# Patient Record
Sex: FEMALE | Race: ASIAN | NOT HISPANIC OR LATINO | Employment: STUDENT | URBAN - METROPOLITAN AREA
[De-identification: names, ages, dates, MRNs, and addresses within clinical notes are randomized per-mention and may not be internally consistent; named-entity substitution may affect disease eponyms.]

---

## 2018-11-25 ENCOUNTER — NURSE TRIAGE (OUTPATIENT)
Dept: NURSING | Facility: CLINIC | Age: 28
End: 2018-11-25

## 2018-11-25 NOTE — TELEPHONE ENCOUNTER
"Patient calling reporting having \"withdrawal\" from Duloxetine medication. Reports having tremors and feeling like her heart is racing. States feeling nauseated and dizzy. Per guideline, advised patient to be seen at the emergency department. Caller verbalized understanding. Denies further questions.      Devyn Salmon RN  [facility]       Reason for Disposition    Feeling very shaky (i.e., visible tremors of hands)    Additional Information    Negative: Coma (e.g., not moving, not talking, not responding to stimuli)    Negative: Difficult to awaken or acting confused  (e.g., disoriented, slurred speech)    Negative: Seeing, hearing, or feeling things that are not there (i.e., visual, auditory, or tactile hallucinations)    Negative: Slow, shallow and weak breathing    Negative: Seizure    Negative: Violent behavior or threatening to kill someone    Negative: Sounds like a life-threatening emergency to the triager    Negative: Suicide thoughts, threats and attempts, questions or concerns about    Negative: Recent significant injury, see that guideline (e.g., head, neck, chest, abdominal or extremity  injury)    Negative: Other significant medical symptom is present, see that guideline (e.g., chest pain, headache, vomiting)    Negative: Alcohol use, abuse or dependence: question or problem related to    Negative: Depression is main problem or symptom (e.g., feelings of sadness or hopelessness)    Negative: [1] Fever > 100.5 F (38.1 C) AND [2] IVDA (intravenous drug abuse)    Negative: Bizarre, paranoid or confused behavior    Protocols used: SUBSTANCE ABUSE AND DEPENDENCE-ADULT-      "

## 2020-09-08 ENCOUNTER — TRANSFERRED RECORDS (OUTPATIENT)
Dept: HEALTH INFORMATION MANAGEMENT | Facility: CLINIC | Age: 30
End: 2020-09-08

## 2020-09-08 ENCOUNTER — MEDICAL CORRESPONDENCE (OUTPATIENT)
Dept: HEALTH INFORMATION MANAGEMENT | Facility: CLINIC | Age: 30
End: 2020-09-08

## 2020-09-09 ENCOUNTER — TRANSCRIBE ORDERS (OUTPATIENT)
Dept: OTHER | Age: 30
End: 2020-09-09

## 2020-09-09 DIAGNOSIS — T78.40XA ALLERGIC REACTION: Primary | ICD-10-CM

## 2020-10-17 NOTE — TELEPHONE ENCOUNTER
FUTURE VISIT INFORMATION      FUTURE VISIT INFORMATION:    Date: 11.16.20    Time: 4:30    Location: [facility]  REFERRAL INFORMATION:    Referring provider:  Dr. Agnieszka Farris    Referring providers clinic:  [facility]    Reason for visit/diagnosis  new, allergic reaction in the mouth, itchy and numb inner cheek and roof of the mouth, per pt, referred by Dr. Farris from [facility] recs in [facility]    RECORDS REQUESTED FROM:       Clinic name Comments Records Status Imaging Status   [place] 9.8.20  Dr. Farris [facility] N/A

## 2020-11-16 ENCOUNTER — PRE VISIT (OUTPATIENT)
Dept: ALLERGY | Facility: CLINIC | Age: 30
End: 2020-11-16

## 2020-11-16 ENCOUNTER — OFFICE VISIT (OUTPATIENT)
Dept: ALLERGY | Facility: CLINIC | Age: 30
End: 2020-11-16
Attending: FAMILY MEDICINE
Payer: COMMERCIAL

## 2020-11-16 DIAGNOSIS — L20.89 OTHER ATOPIC DERMATITIS: ICD-10-CM

## 2020-11-16 DIAGNOSIS — L23.89 ALLERGIC CONTACT DERMATITIS DUE TO OTHER AGENTS: Primary | ICD-10-CM

## 2020-11-16 DIAGNOSIS — J30.89 SEASONAL ALLERGIC RHINITIS DUE TO OTHER ALLERGIC TRIGGER: ICD-10-CM

## 2020-11-16 DIAGNOSIS — Z91.018 FOOD ALLERGY: ICD-10-CM

## 2020-11-16 PROCEDURE — 99203 OFFICE O/P NEW LOW 30 MIN: CPT | Mod: 25 | Performed by: DERMATOLOGY

## 2020-11-16 PROCEDURE — 95044 PATCH/APPLICATION TESTS: CPT | Performed by: DERMATOLOGY

## 2020-11-16 PROCEDURE — 95044 PATCH/APPLICATION TESTS: CPT | Mod: 59 | Performed by: DERMATOLOGY

## 2020-11-16 PROCEDURE — 95004 PERQ TESTS W/ALRGNC XTRCS: CPT | Performed by: DERMATOLOGY

## 2020-11-16 RX ORDER — OMEGA-3/DHA/EPA/FISH OIL 60 MG-90MG
CAPSULE ORAL
COMMUNITY
End: 2021-10-26

## 2020-11-16 ASSESSMENT — PAIN SCALES - GENERAL: PAINLEVEL: NO PAIN (0)

## 2020-11-16 NOTE — NURSING NOTE
Allergy Rooming Note    Murtaza Arroyo's goals for this visit include:   Chief Complaint   Patient presents with     Allergy Consult     Murtaza is here for an allergy consult relating to oral swelling with unknown cause.      Uzma Cameron LPN

## 2020-11-16 NOTE — PROGRESS NOTES
[facility] Dermato-allergology note      Allergy Problem List:    Specialty Problems     None          CC:   No chief complaint on file.        Encounter Date: Nov 16, 2020    History of Present Illness:  I have reviewed the existing informations with patient personally, in Epic and other sources including the nursing intake corresponding to this issue. Murtaza Arroyo is a 30 year old female who presents to the consult  in person     Problems with the oral lesions started about 2 years ago with flare-ups that can last several days. Usually very red and swollen, no ulcers. Generally no pain or itchiness, but more numb. Tip of tongue can be very itchy    Numb feeling under tongue and cheek area with feeling of swelling, never breathing problems after tree nuts (almonds) and less peanuts. Can sometimes stay for 12h or more.    Patient has Rhinoconjunctivitis after contact with cat and more symptoms in summer (July-September) --> more runny nose  Never had Asthma    Has flexural eczema more in summer since childhood    The most recent flare up was about 6 weeks ago. Had treatment of root canal and 1 day later had under the tongue redness and swelling ==> this feeling was there for about 5 days    Patient had 2 years ago a biopsy and reacted there to the band aid and pain relief patch      Aunt and grandfather have Asthma    Past Medical History:   There is no problem list on file for this patient.    No past medical history on file.    Allergy History:   Not on File    Social History:  The patient works as a  (computer). Patient has the following hobbies or non-occupational exposure: climbing and snow boarding      Family History:  No family history on file.    Medications:  No current outpatient medications on file.       Review of Systems:  -As per HPI  -Constitutional: The patient denies fatigue, fevers, chills, unintended weight loss, and night sweats.  -HEENT: Patient denies nonhealing oral  sores.  -Skin: As above in HPI. No additional skin concerns.    Physical exam:  Vitals: There were no vitals taken for this visit.  GEN: This is a well developed, well-nourished female in no acute distress, in a pleasant mood.    Skin: Focused examination of the oral cavity and face within the consultation was performed.   Mucus membrane diffusely erythematous and slightly atrophic, but no signs for erosions or ulcerations.   Chewing rim along the cheeks  For test results on arms see results below  As above in HPI. No additional skin concerns.  - upper respiratory tract: currently no obvious Rhinitis  - lungs: no signs for active and present Asthma/Wheezing or coughing  - eyes: currently no active conjunctivits  - GI system/digestion: currently no problems, no bloating or Diarrhoea      Allergy Tests:    Past Allergy Test: spec IgE in [place] from September 2020  All negatives to tuna, salmon, [place], cashew, hazelnut, sesame, scallop, shrimp, soybean, milk, codfish, walnut, wheat, peanut, egg white,     Future Allergy Test: 11/16/2020       Order for PRICK TESTS    [x] Outpatient  [] Inpatient: [place]..../ Bed ....      Skin Atopy (atopic dermatitis) [x] Yes   [] No  Contact allergies:   [] Yes   [x] No  Urticaria/Angioedema  [x] Yes   [] No  Rhinitis/Sinusitis:   [x] Yes   [] No   [] seasonal [x] perennial            Allergic Asthma:   [] Yes   [x] No  Pets :  [x] Yes   [] No  Which? 2 cats and 1 dog (into bedroom and on bed)  Food Allergy:   [x] Yes   [] No tree nuts?  Drug allergies:   [] Yes   [x] No      Reason for tests (suspected allergy): oral allergy syndrome to tree nuts  Known previous allergies: none    Standardized prick panels  [x] Atopic panel (20 tests)  [] Pediatric Panel (12 tests)  [] Milk, Meat, Eggs, Grains (20 tests)   [] Dust, Epithelia, Feathers (10 tests)  [] Fish, Seafood, Shellfish (17 tests)  [x] Nuts, Beans (14 tests)  [] Spice, Vegetable, Fruit (17 tests)  [] Others: ...      []  Patient's own products: ...    DO NOT test if chemical or biological identity is unknown!     always ask from patient the product information and safety sheets (MSDS)     Order for PATCH TESTS  Reason for tests (suspected allergy): oral contact allergy  Known previous allergies: none    Standardized panels  [x] Standard panel (40 tests)  [x] Preservatives & Antimicrobials (31 tests)  [x] Emulsifiers & Additives (25 tests)   [x] Perfumes/Flavours & Plants (25 tests)  [] Hairdresser panel (12 tests)  [] Rubber Chemicals (22 tests)  [x] Plastics (26 tests)  [] Colorants/Dyes/Food additives (20 tests)  [] Metals (implants/dental) (24 tests)  [] Local anaesthetics/NSAIDs (14 tests)  [] Antibiotics & Antimycotics (14 tests)   [] Corticosteroids (15 tests)   [] Photopatch test (62 tests)   [] others: ...      Atopy Screen (Placed 11/16/20 )    No Substance Readings (15 min) Evaluation   POS Histamine 1mg/ml ++    NEG NaCl 0.9% -      No Substance Readings (15 min) Evaluation   1 Alternaria alternata (tenuis)  -    2 Cladosporium herbarum -    3 Aspergillus fumigatus -    4 Penicillium notatum -    5 Dermatophagoides pteronyssinus -    6 Dermatophagoides farinae -    7 Dog epithelium (canis spp) -    8 Cat hair (denise catus) -    9 Cockroach   (Blatella americana & germanica) -    10 Grass mix midwest   (Sloane, Orchard, Redtop, Thanh) -    11 Bobby grass (sorghum halepense) -    12 Weed mix   (common Cocklebur, Lamb s quarters, rough redroot Pigweed, Dock/Sorrel) -    13 Mug wort (artemisia vulgare) -    14 Ragweed giant/short (ambrosia spp) -    15 English Plantain (plantago lanceolata) -    16 Tree mix 1 (Pecan, Maple BHR, Oak RVW, american [place], black [place]) -    17 Red cedar (juniperus virginia) -    18 Tree mix 2   (white Kamari, river/red Birch, black Gabbs, common [place], american Elm) -    19 Box elder/Maple mix (acer spp) -    20 [place] shagbark (carya ovata) -       -        No Substance Readings  (15min) Evaluation   1 [place] (prunus dulcis) -    2 Brazil nut (bertholletia excels) -    3 Cashew nut (anacardium occidentale) -    4 Coconut (cocos nucifera) -    5 Hazelnut (corylus americana) -    6 Pecan (carya illinoinensis) -    7 [place] (juglans regia) -    8 Pistachio nut (pistacia vera) -    9 Peanut (arachis hypogaea) -    10  Soybean (glycine max)                            -      Conclusion: no signs for specific immediate type hypersensitivity to any food allergen    STANDARD Series      No Substance 2 days 4 days remarks   1 Tab Mix [C] - -    2 Colophony - -    3  2-Mercaptobenzothiazole  - -     4 Methylisothiazolinone - -    5 Carba Mix - -    6 Thiuram Mix [A] - -    7 Bisphenol A Epoxy Resin - -    8 Q-Bjyn-Vlmyrmgxljo-Formaldehyde Resin - -    9 Mercapto Mix [A] - -    10 Black Rubber Mix- PPD [B] - -    11 Potassium Dichromate  -  -    12 Balsam of [place] (Myroxylon Pereirae Resin) - -    13 Nickel Sulphate Hexahydrate - -    14 Mixed Dialkyl Thiourea - -    15 Paraben Mix [B] - -    16 Methyldibromo Glutaronitrile - -    17 Fragrance Mix - -    18 2-Bromo-2-Nitropropane-1,3-Diol (Bronopol) - -    19 Lyral - -    20 Tixocortol-21- Pivalate - -    21 Diazolidiyl Urea (Germall II) - -    22 Methyl Methacrylate - -    23 Cobalt (II) Chloride Hexahydrate - -    24 Fragrance Mix II  - -    25 Compositae Mix - -    26 Benzoyl Peroxide - -    27 Bacitracin - -    28 Formaldehyde - -    29 Methylchloroisothiazolinone / Methylisothiazolinone - -    30 Corticosteroid Mix - -    31 Sodium Lauryl Sulfate - -    32 Lanolin Alcohol - -    33 Turpentine - -    34 Cetylstearylalcohol - -    35 Chlorhexidine Dicluconate - -    36 Budenoside - -    37 Imidazolidinyl Urea  - -    38 Ethyl-2 Cyanoacrylate - -    39 Quaternium 15 (Dowicil 200) - -    40 Decyl Glucoside - -      PLASTICS     No Substance 2 days 4 days remarks     Acrylates - -     1 2-Hydroxyethyl Methacrylate (HEMA) - -     2  1,4-Butandioldimethacrylate (BUDMA) - -     3  2-Ethylhexyl Acrylate - -     4 Bisphenol-A-Dimethacrylate  - -     5 Diurethane-Dimethacrylate - -     6 Ethyleneglycoldimethacrylate (EGDMA) - -     7 Pentaerythritoltriacrylate (WON) - -     8 Triethylene Glycol Dimethacrylate (TEGDMA) - -      Synthetic material/additives        9 M-Obqv-Rnuuateziuk - -     10 Tricresyl Phosphate - -     11 9-Fvyx-Zfbhaalodvyyy - -     12 Bis (2-Ethylhexyl) Phthalate - -     13 Dibutylphthalate - -     14 Dimethylphthalate - -     15 Toluene-2,4-Diisocyanate - -     16 Diphenylmethane-4,4''-Diisocyanate - -      EPOXY RESIN SYSTEMS        Reactive Solvents - -     17 Cresyl Glycidyl Ether - -     18 Butyl Glycidyl Ether - -     19 Phenyl Glycidyl Ether - -     20 1,4-Butanediol Diglycidyl Ether - -     21 1,6-Hexanediole Diglycidyl Ether - -      Hardener / Accelerator - -     22 Triethylenetetramine - -     23 Diethylenetriamine - -     24 Isophorone Diamine (IPD) - -     25 N,N-Dimethyl-P-Toluidine - -      EMULSIFIERS & ADDITIVES   No Substance 2 days 4 days remarks    1 Polyethylene Glycol-400 - -     2 Cocamidopropyl Betaine - -     3 Amerchol L101 - -     4 Propylene Glycol - -     5 Triethanolamine - -     6 Sorbitane Sesquiolate - -     7 Isopropylmyristate - -     8 Polysorbate 80  - -     9 Amidoamine   (Stearamidopropyl Dimethylamine) - -     10 Oleamidopropyl Dimethylamine - -     11 Lauryl Glucoside - -     12 Coconut Diethanolamide  - -     13 2-Hydroxy-4-Methoxy Benzophenone (Oxybenzone) - -     14 Benzophenone-4 (Sulisobenzon) - -     15 Propolis - -     16 Dexpanthenol - -     17 Carboxymethyl Cellulose Sodium - -     18 Abitol - -     19 Tert-Butylhydroquinone - -     20 Benzyl Salicylate - -      Antioxidant       21 Dodecyl Gallate - -     22 Butylhydroxyanisole (BHA) - -     23 Butylhydroxytoluene (BHT) - -     24 Di-Alpha-Tocopherol (Vit E) - -     25 Propyl Gallate - -     26 Zinc Pyrithione       27  Dimethylaminopropylamin (DMAPA)        PRESERVATIVES & ANTIMICROBIALS     No Substance 2 days 4 days remarks    1  1,2-Benzisothiazoline-3-One, Sodium Salt - -     2  1,3,5-Renard (2-Hydroxyethyl) - Hexahydrotriazine (Grotan BK) - -     3 4-Velobfilnxjyu-4-Nitro-1, 3-Propanediol - -     4  3, 4, 4' - Triclocarban - -     5 4 - Chloro - 3 - Cresol - -     6 4 - Chloro - 4 - Xylenol (PCMX) - -     7 7-Ethylbicyclooxazolidine (Bioban [id number]) - -     8 Benzalkonium Chloride - -     9 Benzyl Alcohol - -     10 Cetalkonium Chloride - -     11 Cetylpyrimidine Chloride  - -     12 Chloroacetamide - -     13 DMDM Hydantoin - -     14 Glutaraldehyde - -     15 Triclosan - -     16 Glyoxal Trimeric Dihydrate - -     17 Iodopropynyl Butylcarbamate - -     18 Octylisothiazoline - -     19 Iodoform - -     20 (Nitrobutyl) Morpholine/(Ethylnitro-Trimethylene) Dimorpholine (Bioban P 1487) - -     21 Phenoxyethanol - -     22 Phenyl Salicylate - -     23 Povidone Iodine - -     24 Sodium Benzoate - -     25 Sodium Disulfite - -     26 Sorbic Acid - -     27 Thimerosal - -     28 Melamine Formeladyde Resin       29 Ethylenediamine Dihydrochloride        Parabens       30 Butyl-P-Hydroxybenzoate - -     31 Ethyl-P-Hydroxybenzoate - -     32 Methyl-P-Hydroxybenzoate - -     33 Propyl-P-Hydroxybenzoate - -      PERFUMES, FLAVORS & PLANTS      No Substance 2 days 4 days remarks    1 Benzyl Cinnamate - -     2 Di-Limonene (Dipentene) - -     3 Cananga Odorata (Toni Muñoz) (I) - -     4 Lichen Acid Mix - -     5 Mentha Piperita Oil (Peppermint Oil) - -     6 Sesquiterpenelactone mix - -     7 Tea Tree Oil, Oxidized - -     8 Wood Tar Mix - -     9 Abietic Acid - -     10 Lavendula Angustifolia Oil (Lavender Oil) - -     11 Camphor  - -      Fragrance Mix I       12 Oakmoss Absolute - -     13 Eugenol - -     14 Geraniol - -     15 Hydroxycitronellal - -     16 Isoeugenol - -     17 Cinnamic Aldehyde - -     18 Cinnamic Alcohol  - -       Fragrance mix II       19 Citronellol - -     20 Alpha-Hexylcinnamic Aldehyde    - -     21 Citral - -     22 Farnesol - -     23 Coumarin - -        OTHER PRODUCTS      No Substance Conc  % solv 2 days 4 days remarks    1          2          3          4          5          6          7          8          9          10           Results of patch tests:                         Interpretation:    - Negative                    A    = Allergic      (+) Erythema    TI   = Toxic/irritant   + E + Infiltration    RaP = Relevance at Present     ++ E/I + Papulovesicle   Rpr  = Relevance Previously     +++ E/I/P + Blister     nR   = No Relevance        Impression/Plan:    # Atopic predisposition with:    Flexural eczema since childhood    seasona Rhinitis July to September    RC after contact with cat    Possible oral allergy syndrome to tree nuts (Almonds), less peanuts, not soy    # possible delayed type hypersensitivity to tooth paste or mouth water and oral symptoms    Also reactions to band aid  --> perform patch tests    Patient counseling:  Explained the different types of tests      Follow-up: in Derm-Allergy clinic for readings of patch tests after 2 days (1st readings) = virtual and 2nd readings after 4 days    I spent a total of 40 minutes face to face with Murtaza Arroyo during today s office visit. Over 50% of this time was spent counseling the patient and/or coordinating care.  Please see Assessment and Plan for details.  This excludes any time spent performing prick and patch tests    Thank you for the opportunity be involved in the care of this patient.     Staff: Uzma Cameron LPN

## 2020-11-16 NOTE — PATIENT INSTRUCTIONS
WHAT IS A SKIN PRICK TEST?   A skin prick test is a simple and reliable diagnostic test used to identify substances ( allergens ) responsible for triggering the symptoms of allergy. The basic skin prick testing panel includes common allergens, such as house dust mites, molds, dog and cat allergens and pollen allergens. We have other more specialized series for various food allergens and sometimes we test your own foods directly on your skin as well. Tests will usually be performed on the skin of the forearm. The skin may develop a red and itchy reaction which can be an indication of an allergy to the tested substance, but will be confirmed by discussion with the allergy specialist     HOW IS A SKIN PRICK TEST PERFORMED?   The skin will be disinfected with alcohol, then marked and numbered with a pen to identify the areas where the specific allergens will be tested.   Afterwards a drop of the allergen solution will be placed on the skin and then the skin gently pricked using the tip of a specially designed prick needle.   With this procedure the most superficial part of the skin will be pierced to allow the test solution to diffuse into the skin and make contact with the reactive immune cells.   After 20 min the area will be examined and evaluated for possible allergic reactions.     WHAT ARE THE POSSIBLE RISKS OF A SKIN PRICK TEST?   If the skin reacts it will develop red, itchy wheals of 5-30mm diameter.   The wheal and itch will usually disappear spontaneously after 1-2 hours.   Sometimes there might be a delayed reactions after days and this has to be reported to the treating allergy specialist (could be another kind of reaction pattern and important piece of information).   Rarely there are more serious generalized allergic reactions observed and therefore you will be under observation of the allergy team during the entire procedure.   There is a small risk for some bleeding and skin infection by the  SPT.    WHAT DO I NEED TO DO BEFORE SKIN PRICK TEST?  Stop all antihistamines for at lease 1 week.  Stop all oral steroids at lease 1 month prior to testing.     Patch Testing Information  What are allergen patch tests?    The test is done to look for skin allergies that may be causing rashes and irritation.    A patch test is a way of identifying whether a substance has caused a delayed reaction with skin inflammation, such as contact eczema or delayed (after days) reactions to drugs.     We will use various types of test compounds, which may include common allergens you may come in contact with in daily life such as preservatives, fragrances or even drugs.     Most of the time we will use standardized, prefabricated test solutions. The choice of the substances and series tested will depend on your history of reactions. Sometimes we will test your own products as well.     In order to avoid severe toxic reactions we need detailed information or safety sheets for each of the test compounds.    The test panels are set up with a small amount of common substances that cause skin allergies. They are taped to your skin with a clear hypoallergenic bandage and reinforced hypoallergenic tape.    The substances are numbered, so it is easy to tell what is causing a skin reaction.  What do I need to do in preparation for the test?    Stop all systemic steroids 1 month prior to the testing.     Stop applying topical steroids to the test area one week prior to the test. It is to use them elsewhere throughout the testing process. If this is not possible then discuss options with the Allergy specialist.    Do not go sunbathing or tanning for one week prior to testing    It is okay to take antihistamines as normal.     Please wear dark colors the week of the test since we will write on you with a dark marker that may transfer and stain clothing or bedding.     Some medications can affect the reactions to allergens during the tests.  Therefore reveal all the medications you take to the allergy team. The doctor will discuss the medications with you before the tests.     Eat how you normally would.    Avoid the following:    You cannot get the test area wet during the entire test period. This means no bathing or swimming the entire test period.    No strenuous exercise that may cause sweating.    Do not scratch the test area, this can cause the allergen to spread and give us false positives.     Avoid exposure to UV irradiation. This means no tanning or UV treatment during the testing period.   What can I expect?    Patch testing is done over three appointments.   o The usual schedule is: Monday (Allergen patches are placed), Wednesday (Patches are removed and skin is examined by the MD), and Friday (Skin is examined by the MD)      If you are allergic, there will be an area of irritation where the test was placed.    Itching or burning at the test site might happen if you are allergic to the allergen.  Do not rub or scratch the test site since this may spread the allergen and possibly cause false positives. If itching or burning is not tolerable please contact the clinic.    The marker we draw on your back with ma take up to 5 weeks to wash off completely. Rubbing alcohol can help speed up this process.     Reactions can occur 1 to 2 weeks after the tests are applied. If this happens please take photos of the area and contact the clinic.  What should I do after the tests are placed?    Keep the area dry. No showering or getting the test area wet from the time we see you at your first visit until after your third appointment. If you get the test area wet you are washing off the test and we could get false negative reactions.    If you notice any of the test patches coming loose put on more tape to re-secure the test.    If the marker that is applied fades you can use a dark pen to maddy around the panel sites.    Cover the test area when you are  outside to avoid any sun exposure while the test is in place.     You can remove the tests only if there is a severe reaction (itch, pain, blistering). Please report this to your doctor immediately. If you have to remove the tests please maddy the locations of each test field with a grid so we can identify the allergen.  WHAT ARE THE POSSIBLE RISKS OF PATCH TESTS     If you are allergic to a compound tested you will develop a localized skin reaction similar to your previous reaction, this may take days to develop. These reactions include a formation of red, itchy skin lesions that could be about a centimeter with small vesicles or possibly even blisters. The lesions will fade over time, this may take weeks. The test might leave some skin pigmentation for a few months.     In rare cases a localized reaction to patch testing can become generalized.     The tests with your own products might have some risks because they are not standardized and unanticipated reactions could occur. We need as much information as possible to evaluate if your own products are safe to test and under what conditions. This has to be evaluated for each individual product.   Useful Contact Information    To contact your doctor you can either send a [employer] message or call [phone]    Address  o [address] [place] [address], [address]    If you develop any serious or adverse allergic reaction after office hours please seek immediate medical assistance at the nearest clinic, urgent care, or emergency room.    Removal of Patch Tests on Day 3:    1. Remove patches and tape from one test area (one rectangle)          2. Using the purple surgical markers provided (or other permanent marker), draw a grid around the test area so that the circular indentation is in the center of each square, as below. Try to be as neat as possible and keep the lines as straight as you can (you can use a ruler if you need to)          3. Redraw the number  that was underneath the tape above the grids, as shown below. Try to print clearly.          4. Repeat the process for the remaining test areas.          5. Photograph the entire area then take close-up photos of any possible reactions. Examples of possible reactions are spots that look like these:          6. Return to clinic for evaluation as instructed. You should continue to avoid getting the area wet (no showering or strenuous exercise), exposing the area to UV light, using topical steroids, or scratching.         Who should I call with questions?    [facility], [place]: [phone]    For urgent needs outside of business hours call the [facility] at [phone] and ask for the dermatology resident on call      If you develop any serious or adverse allergic reaction after office hours please seek immediate medical assistance at the nearest clinic or emergency room

## 2020-11-16 NOTE — LETTER
11/16/2020         RE: Murtaza Arroyo  [address]  [facility] [address]        Dear Colleague,    Thank you for referring your patient, Murtaza Arroyo, to the [facility] [place]. Please see a copy of my visit note below.    [facility] Dermato-allergology note      Allergy Problem List:    Specialty Problems     None          CC:   No chief complaint on file.        Encounter Date: Nov 16, 2020    History of Present Illness:  I have reviewed the existing informations with patient personally, in Epic and other sources including the nursing intake corresponding to this issue. Murtaza Arroyo is a 30 year old female who presents to the consult  in person     Problems with the oral lesions started about 2 years ago with flare-ups that can last several days. Usually very red and swollen, no ulcers. Generally no pain or itchiness, but more numb. Tip of tongue can be very itchy    Numb feeling under tongue and cheek area with feeling of swelling, never breathing problems after tree nuts (almonds) and less peanuts. Can sometimes stay for 12h or more.    Patient has Rhinoconjunctivitis after contact with cat and more symptoms in summer (July-September) --> more runny nose  Never had Asthma    Has flexural eczema more in summer since childhood    The most recent flare up was about 6 weeks ago. Had treatment of root canal and 1 day later had under the tongue redness and swelling ==> this feeling was there for about 5 days    Patient had 2 years ago a biopsy and reacted there to the band aid and pain relief patch      Aunt and grandfather have Asthma    Past Medical History:   There is no problem list on file for this patient.    No past medical history on file.    Allergy History:   Not on File    Social History:  The patient works as a  (computer). Patient has the following hobbies or non-occupational exposure: climbing and snow boarding      Family History:  No  family history on file.    Medications:  No current outpatient medications on file.       Review of Systems:  -As per HPI  -Constitutional: The patient denies fatigue, fevers, chills, unintended weight loss, and night sweats.  -HEENT: Patient denies nonhealing oral sores.  -Skin: As above in HPI. No additional skin concerns.    Physical exam:  Vitals: There were no vitals taken for this visit.  GEN: This is a well developed, well-nourished female in no acute distress, in a pleasant mood.    Skin: Focused examination of the oral cavity and face within the consultation was performed.   Mucus membrane diffusely erythematous and slightly atrophic, but no signs for erosions or ulcerations.   Chewing rim along the cheeks  For test results on arms see results below  As above in HPI. No additional skin concerns.  - upper respiratory tract: currently no obvious Rhinitis  - lungs: no signs for active and present Asthma/Wheezing or coughing  - eyes: currently no active conjunctivits  - GI system/digestion: currently no problems, no bloating or Diarrhoea      Allergy Tests:    Past Allergy Test: spec IgE in [place] from September 2020  All negatives to tuna, salmon, Dawson, cashew, hazelnut, sesame, scallop, shrimp, soybean, milk, codfish, walnut, wheat, peanut, egg white,     Future Allergy Test: 11/16/2020       Order for PRICK TESTS    [x] Outpatient  [] Inpatient: [place]..../ Bed ....      Skin Atopy (atopic dermatitis) [x] Yes   [] No  Contact allergies:   [] Yes   [x] No  Urticaria/Angioedema  [x] Yes   [] No  Rhinitis/Sinusitis:   [x] Yes   [] No   [] seasonal [x] perennial            Allergic Asthma:   [] Yes   [x] No  Pets :  [x] Yes   [] No  Which? 2 cats and 1 dog (into bedroom and on bed)  Food Allergy:   [x] Yes   [] No tree nuts?  Drug allergies:   [] Yes   [x] No      Reason for tests (suspected allergy): oral allergy syndrome to tree nuts  Known previous allergies: none    Standardized prick panels  [x] Atopic  panel (20 tests)  [] Pediatric Panel (12 tests)  [] Milk, Meat, Eggs, Grains (20 tests)   [] Dust, Epithelia, Feathers (10 tests)  [] Fish, Seafood, Shellfish (17 tests)  [x] Nuts, Beans (14 tests)  [] Spice, Vegetable, Fruit (17 tests)  [] Others: ...      [] Patient's own products: ...    DO NOT test if chemical or biological identity is unknown!     always ask from patient the product information and safety sheets (MSDS)     Order for PATCH TESTS  Reason for tests (suspected allergy): oral contact allergy  Known previous allergies: none    Standardized panels  [x] Standard panel (40 tests)  [x] Preservatives & Antimicrobials (31 tests)  [x] Emulsifiers & Additives (25 tests)   [x] Perfumes/Flavours & Plants (25 tests)  [] Hairdresser panel (12 tests)  [] Rubber Chemicals (22 tests)  [x] Plastics (26 tests)  [] Colorants/Dyes/Food additives (20 tests)  [] Metals (implants/dental) (24 tests)  [] Local anaesthetics/NSAIDs (14 tests)  [] Antibiotics & Antimycotics (14 tests)   [] Corticosteroids (15 tests)   [] Photopatch test (62 tests)   [] others: ...      Atopy Screen (Placed 11/16/20 )    No Substance Readings (15 min) Evaluation   POS Histamine 1mg/ml ++    NEG NaCl 0.9% -      No Substance Readings (15 min) Evaluation   1 Alternaria alternata (tenuis)  -    2 Cladosporium herbarum -    3 Aspergillus fumigatus -    4 Penicillium notatum -    5 Dermatophagoides pteronyssinus -    6 Dermatophagoides farinae -    7 Dog epithelium (canis spp) -    8 Cat hair (denise catus) -    9 Cockroach   (Blatella americana & germanica) -    10 Grass mix midwest   (Sloane, Orchard, Redtop, Thanh) -    11 Bobby grass (sorghum halepense) -    12 Weed mix   (common Cocklebur, Lamb s quarters, rough redroot Pigweed, Dock/Sorrel) -    13 Mug wort (artemisia vulgare) -    14 Ragweed giant/short (ambrosia spp) -    15 English Plantain (plantago lanceolata) -    16 Tree mix 1 (Pecan, Maple BHR, Oak RVW, american [place], black  Venango) -    17 Red cedar (juniperus virginia) -    18 Tree mix 2   (white Kamari, river/red Birch, black [place], common [place], american Elm) -    19 Box elder/Maple mix (acer spp) -    20 Billings shagbark (carya ovata) -       -        No Substance Readings (15min) Evaluation   1 [place] (prunus dulcis) -    2 Brazil nut (bertholletia excels) -    3 Cashew nut (anacardium occidentale) -    4 Coconut (cocos nucifera) -    5 Hazelnut (corylus americana) -    6 Pecan (carya illinoinensis) -    7 [place] (juglans regia) -    8 Pistachio nut (pistacia vera) -    9 Peanut (arachis hypogaea) -    10  Soybean (glycine max)                            -      Conclusion: no signs for specific immediate type hypersensitivity to any food allergen    STANDARD Series      No Substance 2 days 4 days remarks   1 Tab Mix [C] - -    2 Colophony - -    3  2-Mercaptobenzothiazole  - -     4 Methylisothiazolinone - -    5 Carba Mix - -    6 Thiuram Mix [A] - -    7 Bisphenol A Epoxy Resin - -    8 T-Nrkc-Oauhtlubnsm-Formaldehyde Resin - -    9 Mercapto Mix [A] - -    10 Black Rubber Mix- PPD [B] - -    11 Potassium Dichromate  -  -    12 Balsam of [place] (Myroxylon Pereirae Resin) - -    13 Nickel Sulphate Hexahydrate - -    14 Mixed Dialkyl Thiourea - -    15 Paraben Mix [B] - -    16 Methyldibromo Glutaronitrile - -    17 Fragrance Mix - -    18 2-Bromo-2-Nitropropane-1,3-Diol (Bronopol) - -    19 Lyral - -    20 Tixocortol-21- Pivalate - -    21 Diazolidiyl Urea (Germall II) - -    22 Methyl Methacrylate - -    23 Cobalt (II) Chloride Hexahydrate - -    24 Fragrance Mix II  - -    25 Compositae Mix - -    26 Benzoyl Peroxide - -    27 Bacitracin - -    28 Formaldehyde - -    29 Methylchloroisothiazolinone / Methylisothiazolinone - -    30 Corticosteroid Mix - -    31 Sodium Lauryl Sulfate - -    32 Lanolin Alcohol - -    33 Turpentine - -    34 Cetylstearylalcohol - -    35 Chlorhexidine Dicluconate - -    36 Budenoside - -    37  Imidazolidinyl Urea  - -    38 Ethyl-2 Cyanoacrylate - -    39 Quaternium 15 (Dowicil 200) - -    40 Decyl Glucoside - -      PLASTICS     No Substance 2 days 4 days remarks     Acrylates - -     1 2-Hydroxyethyl Methacrylate (HEMA) - -     2 1,4-Butandioldimethacrylate (BUDMA) - -     3  2-Ethylhexyl Acrylate - -     4 Bisphenol-A-Dimethacrylate  - -     5 Diurethane-Dimethacrylate - -     6 Ethyleneglycoldimethacrylate (EGDMA) - -     7 Pentaerythritoltriacrylate (WON) - -     8 Triethylene Glycol Dimethacrylate (TEGDMA) - -      Synthetic material/additives        9 G-Vonn-Kpcegwvwrjb - -     10 Tricresyl Phosphate - -     11 5-Angg-Rlzdxzpnlwynj - -     12 Bis (2-Ethylhexyl) Phthalate - -     13 Dibutylphthalate - -     14 Dimethylphthalate - -     15 Toluene-2,4-Diisocyanate - -     16 Diphenylmethane-4,4''-Diisocyanate - -      EPOXY RESIN SYSTEMS        Reactive Solvents - -     17 Cresyl Glycidyl Ether - -     18 Butyl Glycidyl Ether - -     19 Phenyl Glycidyl Ether - -     20 1,4-Butanediol Diglycidyl Ether - -     21 1,6-Hexanediole Diglycidyl Ether - -      Hardener / Accelerator - -     22 Triethylenetetramine - -     23 Diethylenetriamine - -     24 Isophorone Diamine (IPD) - -     25 N,N-Dimethyl-P-Toluidine - -      EMULSIFIERS & ADDITIVES   No Substance 2 days 4 days remarks    1 Polyethylene Glycol-400 - -     2 Cocamidopropyl Betaine - -     3 Amerchol L101 - -     4 Propylene Glycol - -     5 Triethanolamine - -     6 Sorbitane Sesquiolate - -     7 Isopropylmyristate - -     8 Polysorbate 80  - -     9 Amidoamine   (Stearamidopropyl Dimethylamine) - -     10 Oleamidopropyl Dimethylamine - -     11 Lauryl Glucoside - -     12 Coconut Diethanolamide  - -     13 2-Hydroxy-4-Methoxy Benzophenone (Oxybenzone) - -     14 Benzophenone-4 (Sulisobenzon) - -     15 Propolis - -     16 Dexpanthenol - -     17 Carboxymethyl Cellulose Sodium - -     18 Abitol - -     19 Tert-Butylhydroquinone - -     20  Benzyl Salicylate - -      Antioxidant       21 Dodecyl Gallate - -     22 Butylhydroxyanisole (BHA) - -     23 Butylhydroxytoluene (BHT) - -     24 Di-Alpha-Tocopherol (Vit E) - -     25 Propyl Gallate - -     26 Zinc Pyrithione       27 Dimethylaminopropylamin (DMAPA)        PRESERVATIVES & ANTIMICROBIALS     No Substance 2 days 4 days remarks    1  1,2-Benzisothiazoline-3-One, Sodium Salt - -     2  1,3,5-Renard (2-Hydroxyethyl) - Hexahydrotriazine (Grotan BK) - -     3 9-Nrupnqzvhucgs-6-Nitro-1, 3-Propanediol - -     4  3, 4, 4' - Triclocarban - -     5 4 - Chloro - 3 - Cresol - -     6 4 - Chloro - 4 - Xylenol (PCMX) - -     7 7-Ethylbicyclooxazolidine (Bioban CK1956) - -     8 Benzalkonium Chloride - -     9 Benzyl Alcohol - -     10 Cetalkonium Chloride - -     11 Cetylpyrimidine Chloride  - -     12 Chloroacetamide - -     13 DMDM Hydantoin - -     14 Glutaraldehyde - -     15 Triclosan - -     16 Glyoxal Trimeric Dihydrate - -     17 Iodopropynyl Butylcarbamate - -     18 Octylisothiazoline - -     19 Iodoform - -     20 (Nitrobutyl) Morpholine/(Ethylnitro-Trimethylene) Dimorpholine (Bioban P 1487) - -     21 Phenoxyethanol - -     22 Phenyl Salicylate - -     23 Povidone Iodine - -     24 Sodium Benzoate - -     25 Sodium Disulfite - -     26 Sorbic Acid - -     27 Thimerosal - -     28 Melamine Formeladyde Resin       29 Ethylenediamine Dihydrochloride        Parabens       30 Butyl-P-Hydroxybenzoate - -     31 Ethyl-P-Hydroxybenzoate - -     32 Methyl-P-Hydroxybenzoate - -     33 Propyl-P-Hydroxybenzoate - -      PERFUMES, FLAVORS & PLANTS      No Substance 2 days 4 days remarks    1 Benzyl Cinnamate - -     2 Di-Limonene (Dipentene) - -     3 Cananga Odorata (Toni Muñoz) (I) - -     4 Lichen Acid Mix - -     5 Mentha Piperita Oil (Peppermint Oil) - -     6 Sesquiterpenelactone mix - -     7 Tea Tree Oil, Oxidized - -     8 Wood Tar Mix - -     9 Abietic Acid - -     10 Lavendula Angustifolia Oil  (Lavender Oil) - -     11 Camphor  - -      Fragrance Mix I       12 Oakmoss Absolute - -     13 Eugenol - -     14 Geraniol - -     15 Hydroxycitronellal - -     16 Isoeugenol - -     17 Cinnamic Aldehyde - -     18 Cinnamic Alcohol  - -      Fragrance mix II       19 Citronellol - -     20 Alpha-Hexylcinnamic Aldehyde    - -     21 Citral - -     22 Farnesol - -     23 Coumarin - -        OTHER PRODUCTS      No Substance Conc  % solv 2 days 4 days remarks    1          2          3          4          5          6          7          8          9          10           Results of patch tests:                         Interpretation:    - Negative                    A    = Allergic      (+) Erythema    TI   = Toxic/irritant   + E + Infiltration    RaP = Relevance at Present     ++ E/I + Papulovesicle   Rpr  = Relevance Previously     +++ E/I/P + Blister     nR   = No Relevance        Impression/Plan:    # Atopic predisposition with:    Flexural eczema since childhood    seasona Rhinitis July to September    RC after contact with cat    Possible oral allergy syndrome to tree nuts (Almonds), less peanuts, not soy    # possible delayed type hypersensitivity to tooth paste or mouth water and oral symptoms    Also reactions to band aid  --> perform patch tests    Patient counseling:  Explained the different types of tests      Follow-up: in Derm-Allergy clinic for readings of patch tests after 2 days (1st readings) = virtual and 2nd readings after 4 days    I spent a total of 40 minutes face to face with Murtaza Arroyo during today s office visit. Over 50% of this time was spent counseling the patient and/or coordinating care.  Please see Assessment and Plan for details.  This excludes any time spent performing prick and patch tests    Thank you for the opportunity be involved in the care of this patient.     Staff: Uzma Cameron LPN      Again, thank you for allowing me to participate in the care of your patient.         Sincerely,        Eduardo Reagan MD

## 2020-11-17 ENCOUNTER — TELEPHONE (OUTPATIENT)
Dept: DERMATOLOGY | Facility: CLINIC | Age: 30
End: 2020-11-17

## 2020-11-17 NOTE — TELEPHONE ENCOUNTER
Pt called regarding below. Pt said what should she do to relieve herself from the itch. Please call her back. Thanks

## 2020-11-17 NOTE — TELEPHONE ENCOUNTER
Spoke with patient regarding this. 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 Patient verbalized understanding.

## 2020-11-18 ENCOUNTER — VIRTUAL VISIT (OUTPATIENT)
Dept: ALLERGY | Facility: CLINIC | Age: 30
End: 2020-11-18
Payer: COMMERCIAL

## 2020-11-18 DIAGNOSIS — L20.89 OTHER ATOPIC DERMATITIS: ICD-10-CM

## 2020-11-18 DIAGNOSIS — L23.89 ALLERGIC CONTACT DERMATITIS DUE TO OTHER AGENTS: Primary | ICD-10-CM

## 2020-11-18 PROCEDURE — 99207 PR NO CHARGE LOS: CPT | Mod: 95 | Performed by: DERMATOLOGY

## 2020-11-18 ASSESSMENT — PAIN SCALES - GENERAL: PAINLEVEL: NO PAIN (0)

## 2020-11-18 NOTE — PROGRESS NOTES
"Murtaza Arroyo is a 30 year old female who is being evaluated via a billable video visit.      The patient has been notified of following:     \"This video visit will be conducted via a call between you and your physician/provider. We have found that certain health care needs can be provided without the need for an in-person physical exam.  This service lets us provide the care you need with a video conversation.  If a prescription is necessary we can send it directly to your pharmacy.  If lab work is needed we can place an order for that and you can then stop by our lab to have the test done at a later time.    Video visits are billed at different rates depending on your insurance coverage.  Please reach out to your insurance provider with any questions.    If during the course of the call the physician/provider feels a video visit is not appropriate, you will not be charged for this service.\"    Patient has given verbal consent for Video visit? Yes2  How would you like to obtain your AVS? MyChart  If you are dropped from the video visit, the video invite should be resent to: Send to e-mail at: [email or web address]  Will anyone else be joining your video visit? No        Video-Visit Details    Type of service:  Video Visit    Originating Location (pt. Location): Home    Distant Location (provider location):  [facility] [place]     Platform used for Video Visit: Elsie Cameron LPN        "

## 2020-11-18 NOTE — NURSING NOTE
Allergy Rooming Note    Murtaza Arroyo's goals for this visit include:   Chief Complaint   Patient presents with     Allergy Testing Followup     Murtaza is here for patch testing day 3     Uzma Cameron LPN

## 2020-11-18 NOTE — PROGRESS NOTES
Patient had 141 patch tests placed this visit.    Standard panel (40 tests)    Preservatives & Antimicrobials (31 tests)    Emulsifiers & Additives (25 tests)     Perfumes/Flavours & Plants (22 tests)    Plastics (23 tests)

## 2020-11-18 NOTE — PROGRESS NOTES
Note for return visit for Dermato-Allergy       Encounter Date: Nov 18, 2020    History of Present Illness:  I have reviewed the teledermatology  information and the nursing intake corresponding to this issue. Murtaza Arroyo is a 30 year old female who presents as a teledermatology consult for the following information take directly from the prior notes and video call performed by myself:       Additional comments and observations from review of the patient s chart including the following:    See notes    ROS: Patient generally feeling well today   Physical Examination:  General: Well-appearing, appropriately-developed individual.  - Skin: Examination of the skin on back within the teledermatology was performed.   See test results below  As above in HPI. No additional skin concerns.  - upper respiratory tract: currently no obvious Rhinitis  - lungs: no signs for active and present Asthma/Wheezing or coughing  - eyes: currently no active conjunctivits  - GI system/digestion: currently no problems, no bloating or Diarrhoea    Previous History of Present Illness:    Problems with the oral lesions started about 2 years ago with flare-ups that can last several days. Usually very red and swollen, no ulcers. Generally no pain or itchiness, but more numb. Tip of tongue can be very itchy    Numb feeling under tongue and cheek area with feeling of swelling, never breathing problems after tree nuts (almonds) and less peanuts. Can sometimes stay for 12h or more.    Patient has Rhinoconjunctivitis after contact with cat and more symptoms in summer (July-September) --> more runny nose  Never had Asthma    Has flexural eczema more in summer since childhood    The most recent flare up was about 6 weeks ago. Had treatment of root canal and 1 day later had under the tongue redness and swelling ==> this feeling was there for about 5 days    Patient had 2 years ago a biopsy and reacted there to the band aid and pain relief patch      Aunt and  grandfather have Asthma    Past Medical History:   There is no problem list on file for this patient.    No past medical history on file.    Allergy History:   No Known Allergies    Social History:  The patient works as a  (computer). Patient has the following hobbies or non-occupational exposure: climbing and snow boarding      Family History:  No family history on file.    Medications:  Current Outpatient Medications   Medication Sig Dispense Refill     Cyanocobalamin (VITAMIN B 12) 100 MCG LOZG        Omega-3 Fatty Acids (FISH OIL) 500 MG CAPS          Review of Systems:  -As per HPI  -Constitutional: The patient denies fatigue, fevers, chills, unintended weight loss, and night sweats.  -HEENT: Patient denies nonhealing oral sores.  -Skin: As above in HPI. No additional skin concerns.    Physical exam:  Vitals: There were no vitals taken for this visit.  GEN: This is a well developed, well-nourished female in no acute distress, in a pleasant mood.    Skin: Focused examination of the oral cavity and face within the consultation was performed.   Mucus membrane diffusely erythematous and slightly atrophic, but no signs for erosions or ulcerations.   Chewing rim along the cheeks  For test results on arms see results below  As above in HPI. No additional skin concerns.  - upper respiratory tract: currently no obvious Rhinitis  - lungs: no signs for active and present Asthma/Wheezing or coughing  - eyes: currently no active conjunctivits  - GI system/digestion: currently no problems, no bloating or Diarrhoea      Allergy Tests:    Past Allergy Test: spec IgE in [place] from September 2020  All negatives to tuna, salmon, [place], cashew, hazelnut, sesame, scallop, shrimp, soybean, milk, codfish, walnut, wheat, peanut, egg white,     Future Allergy Test: 11/16/2020       Order for PRICK TESTS    [x] Outpatient  [] Inpatient: [place]..../ Bed ....      Skin Atopy (atopic dermatitis) [x] Yes   [] No  Contact  allergies:   [] Yes   [x] No  Urticaria/Angioedema  [x] Yes   [] No  Rhinitis/Sinusitis:   [x] Yes   [] No   [] seasonal [x] perennial            Allergic Asthma:   [] Yes   [x] No  Pets :  [x] Yes   [] No  Which? 2 cats and 1 dog (into bedroom and on bed)  Food Allergy:   [x] Yes   [] No tree nuts?  Drug allergies:   [] Yes   [x] No      Reason for tests (suspected allergy): oral allergy syndrome to tree nuts  Known previous allergies: none    Standardized prick panels  [x] Atopic panel (20 tests)  [] Pediatric Panel (12 tests)  [] Milk, Meat, Eggs, Grains (20 tests)   [] Dust, Epithelia, Feathers (10 tests)  [] Fish, Seafood, Shellfish (17 tests)  [x] Nuts, Beans (14 tests)  [] Spice, Vegetable, Fruit (17 tests)  [] Others: ...      [] Patient's own products: ...    DO NOT test if chemical or biological identity is unknown!     always ask from patient the product information and safety sheets (MSDS)     Order for PATCH TESTS  Reason for tests (suspected allergy): oral contact allergy  Known previous allergies: none    Standardized panels  [x] Standard panel (40 tests)  [x] Preservatives & Antimicrobials (31 tests)  [x] Emulsifiers & Additives (25 tests)   [x] Perfumes/Flavours & Plants (25 tests)  [] Hairdresser panel (12 tests)  [] Rubber Chemicals (22 tests)  [x] Plastics (26 tests)  [] Colorants/Dyes/Food additives (20 tests)  [] Metals (implants/dental) (24 tests)  [] Local anaesthetics/NSAIDs (14 tests)  [] Antibiotics & Antimycotics (14 tests)   [] Corticosteroids (15 tests)   [] Photopatch test (62 tests)   [] others: ...      Atopy Screen (Placed 11/16/20 )    No Substance Readings (15 min) Evaluation   POS Histamine 1mg/ml ++    NEG NaCl 0.9% -      No Substance Readings (15 min) Evaluation   1 Alternaria alternata (tenuis)  -    2 Cladosporium herbarum -    3 Aspergillus fumigatus -    4 Penicillium notatum -    5 Dermatophagoides pteronyssinus -    6 Dermatophagoides farinae -    7 Dog epithelium (canis  spp) -    8 Cat hair (denise catus) -    9 Cockroach   (Blatella americana & germanica) -    10 Grass mix midwest   (Sloane, Orchard, Redtop, Thanh) -    11 Bobby grass (sorghum halepense) -    12 Weed mix   (common Cocklebur, Lamb s quarters, rough redroot Pigweed, Dock/Sorrel) -    13 Mug wort (artemisia vulgare) -    14 Ragweed giant/short (ambrosia spp) -    15 English Plantain (plantago lanceolata) -    16 Tree mix 1 (Pecan, Maple BHR, Oak RVW, american Mena, black [place]) -    17 Red cedar (juniperus virginia) -    18 Tree mix 2   (white Kamari, river/red Birch, black Sunset, common Birch Tree, american Elm) -    19 Box elder/Maple mix (acer spp) -    20 [place] shagbark (carya ovata) -       -        No Substance Readings (15min) Evaluation   1 [place] (prunus dulcis) -    2 Brazil nut (bertholletia excels) -    3 Cashew nut (anacardium occidentale) -    4 Coconut (cocos nucifera) -    5 Hazelnut (corylus americana) -    6 Pecan (carya illinoinensis) -    7 Sunset (juglans regia) -    8 Pistachio nut (pistacia vera) -    9 Peanut (arachis hypogaea) -    10  Soybean (glycine max)                            -      Conclusion: no signs for specific immediate type hypersensitivity to any food allergen    RESULTS & EVALUATION of PATCH TESTS    Patch test readings after     [x] 2 days, [] 3 days [x] 4 days, [] 5 days,   Other duration: ...    Applied patch tests with results (import here the list of patch tests):    STANDARD Series      No Substance 2 days 4 days remarks   1 Tab Mix [C] - -    2 Colophony - -    3  2-Mercaptobenzothiazole  - -     4 Methylisothiazolinone - -    5 Carba Mix - -    6 Thiuram Mix [A] + -    7 Bisphenol A Epoxy Resin - -    8 I-Uszb-Jxejjohzirn-Formaldehyde Resin - -    9 Mercapto Mix [A] - -    10 Black Rubber Mix- PPD [B] - -    11 Potassium Dichromate  -  -    12 Balsam of [place] (Myroxylon Pereirae Resin) +/++ -    13 Nickel Sulphate Hexahydrate - -    14 Mixed Dialkyl Thiourea -  -    15 Paraben Mix [B] - -    16 Methyldibromo Glutaronitrile - -    17 Fragrance Mix ++ -    18 2-Bromo-2-Nitropropane-1,3-Diol (Bronopol) - -    19 Lyral - -    20 Tixocortol-21- Pivalate - -    21 Diazolidiyl Urea (Germall II) - -    22 Methyl Methacrylate - -    23 Cobalt (II) Chloride Hexahydrate - -    24 Fragrance Mix II  - -    25 Compositae Mix - -    26 Benzoyl Peroxide - -    27 Bacitracin - -    28 Formaldehyde - -    29 Methylchloroisothiazolinone / Methylisothiazolinone - -    30 Corticosteroid Mix - -    31 Sodium Lauryl Sulfate - -    32 Lanolin Alcohol - -    33 Turpentine - -    34 Cetylstearylalcohol - -    35 Chlorhexidine Dicluconate - -    36 Budenoside - -    37 Imidazolidinyl Urea  - -    38 Ethyl-2 Cyanoacrylate - -    39 Quaternium 15 (Dowicil 200) - -    40 Decyl Glucoside - -      PLASTICS     No Substance 2 days 4 days remarks     Acrylates - -    41 1 2-Hydroxyethyl Methacrylate (HEMA) - -    42 2 1,4-Butandioldimethacrylate (BUDMA) - -    43 3  2-Ethylhexyl Acrylate - -    44 4 Bisphenol-A-Dimethacrylate  - -    45 5 Diurethane-Dimethacrylate NA NA    46 6 Ethyleneglycoldimethacrylate (EGDMA) - -    47 7 Pentaerythritoltriacrylate (WON) - -    48 8 Triethylene Glycol Dimethacrylate (TEGDMA) - -      Synthetic material/additives       49 9 T-Lnzt-Yitwddjclbw - -    50 10 Tricresyl Phosphate - -    51 11 9-Vgzd-Kcqtlkfvjvwlr - -    52 12 Bis (2-Ethylhexyl) Phthalate - -    53 13 Dibutylphthalate - -    54 14 Dimethylphthalate - -    55 15 Toluene-2,4-Diisocyanate NA NA    56 16 Diphenylmethane-4,4''-Diisocyanate - -      EPOXY RESIN SYSTEMS        Reactive Solvents - -    57 17 Cresyl Glycidyl Ether - -    58 18 Butyl Glycidyl Ether - -    59 19 Phenyl Glycidyl Ether - -    60 20 1,4-Butanediol Diglycidyl Ether - -    61 21 1,6-Hexanediole Diglycidyl Ether - -      Hardener / Accelerator - -    62 22 Triethylenetetramine - -    63 23 Diethylenetriamine - -    64 24 Isophorone Diamine  (IPD) - -    65 25 N,N-Dimethyl-P-Toluidine - -      EMULSIFIERS & ADDITIVES   No Substance 2 days 4 days remarks   66 1 Polyethylene Glycol-400 - -    67 2 Cocamidopropyl Betaine - -    68 3 Amerchol L101 - -    69 4 Propylene Glycol - -    70 5 Triethanolamine - -    71 6 Sorbitane Sesquiolate - -    72 7 Isopropylmyristate - -    73 8 Polysorbate 80  - -    74 9 Amidoamine   (Stearamidopropyl Dimethylamine) - -    75 10 Oleamidopropyl Dimethylamine - -    76 11 Lauryl Glucoside - -    77 12 Coconut Diethanolamide  - -    78 13 2-Hydroxy-4-Methoxy Benzophenone (Oxybenzone) - -    79 14 Benzophenone-4 (Sulisobenzon) - -    80 15 Propolis - -    81 16 Dexpanthenol - -    82 17 Carboxymethyl Cellulose Sodium NA NA    83 18 Abitol - -    84 19 Tert-Butylhydroquinone - -    85 20 Benzyl Salicylate - -      Antioxidant      86 21 Dodecyl Gallate - -    87 22 Butylhydroxyanisole (BHA) - -    88 23 Butylhydroxytoluene (BHT) - -    89 24 Di-Alpha-Tocopherol (Vit E) - -    90 25 Propyl Gallate - -    91 26 Zinc Pyrithione NA NA    92 27 Dimethylaminopropylamin (DMAPA)        PRESERVATIVES & ANTIMICROBIALS     No Substance 2 days 4 days remarks   93 1  1,2-Benzisothiazoline-3-One, Sodium Salt - -    94 2  1,3,5-Renard (2-Hydroxyethyl) - Hexahydrotriazine (Grotan BK) - -    95 3 9-Vnzndacivogin-4-Nitro-1, 3-Propanediol - -    96 4  3, 4, 4' - Triclocarban - -    97 5 4 - Chloro - 3 - Cresol - -    98 6 4 - Chloro - 4 - Xylenol (PCMX) - -    99 7 7-Ethylbicyclooxazolidine (Bioban AD7230) - -    100 8 Benzalkonium Chloride NA NA    101 9 Benzyl Alcohol - -    102 10 Cetalkonium Chloride - -    103 11 Cetylpyrimidine Chloride  - -    104 12 Chloroacetamide - -    105 13 DMDM Hydantoin - -    106 14 Glutaraldehyde - -    107 15 Triclosan - -    108 16 Glyoxal Trimeric Dihydrate - -    109 17 Iodopropynyl Butylcarbamate - -    110 18 Octylisothiazoline - -    111 19 Iodoform NA NA    112 20 (Nitrobutyl)  Morpholine/(Ethylnitro-Trimethylene) Dimorpholine (Bioban P 1487) - -    113 21 Phenoxyethanol - -    114 22 Phenyl Salicylate - -    115 23 Povidone Iodine - -    116 24 Sodium Benzoate - -    117 25 Sodium Disulfite - -    118 26 Sorbic Acid - -    129 27 Thimerosal - -    120 28 Melamine Formeladyde Resin      121 29 Ethylenediamine Dihydrochloride        Parabens      123 30 Butyl-P-Hydroxybenzoate - -    123 31 Ethyl-P-Hydroxybenzoate - -    124 32 Methyl-P-Hydroxybenzoate - -    125 33 Propyl-P-Hydroxybenzoate - -      PERFUMES, FLAVORS & PLANTS      No Substance 2 days 4 days remarks   126 1 Benzyl Cinnamate - -    127 2 Di-Limonene (Dipentene) - -    128 3 Cananga Odorata (Toni Muñoz) (I) - -    129 4 Lichen Acid Mix - -    130 5 Mentha Piperita Oil (Peppermint Oil) - -    131 6 Sesquiterpenelactone mix - -    132 7 Tea Tree Oil, Oxidized - -    133 8 Wood Tar Mix - -    134 9 Abietic Acid - -    135 10 Lavendula Angustifolia Oil (Lavender Oil) - -    136 11 Camphor  NA NA      Fragrance Mix I      137 12 Oakmoss Absolute - -    138 13 Eugenol - -    139 14 Geraniol - -    140 15 Hydroxycitronellal - -    141 16 Isoeugenol - -    142 17 Cinnamic Aldehyde ++ -    143 18 Cinnamic Alcohol  - -      Fragrance mix II      144 19 Citronellol - -    145 20 Alpha-Hexylcinnamic Aldehyde    - -    146 21 Citral - -    147 22 Farnesol - -    148 23 Coumarin - -      Results of patch tests:                         Interpretation:    - Negative                    A    = Allergic      (+) Erythema    TI   = Toxic/irritant   + E + Infiltration    RaP = Relevance at Present     ++ E/I + Papulovesicle   Rpr  = Relevance Previously     +++ E/I/P + Blister     nR   = No Relevance      [] No relevant allergic reaction observed    [x] Allergic reaction diagnosed against following allergens: Balsam of Peru++, fragrance mix ++, Cinnamic aldehyde ++, Thiuram mix      Interpretation/ remarks:   See later    [] Patient information  given   [] ACDS CAMP information's (# ....) to following compounds: .....   [] General information's to following compounds: ......    ==> final Diagnosis:    # Atopic predisposition with:    Flexural eczema since childhood    seasona Rhinitis July to September    RC after contact with cat    Possible oral allergy syndrome to tree nuts (Almonds), less peanuts, not soy    # possible delayed type hypersensitivity to tooth paste or mouth water and oral symptoms    Also reactions to band aid  --> perform patch tests      These conclusions are made at the best of one's knowledge and belief based on the provided evidence such as patient's history and allergy test results and they can change over time or can be incomplete because of missing information's.    ==> Treatment prescribed/Plan:    Patient counseling:  Explained the different types of tests      Follow-up: in Derm-Allergy clinic for readings of patch tests for 2nd readings after 4 days      Follow-up: in Derm-Allergy clinic for 2nd readings of patch tests after 4 days and final conclusions      Thank you for the opportunity be involved in the care of this patient.     Staff: Uzma Cameron LPN    _____________________________________________________________________________    Teledermatology information:  - Location of patient: Home  - Patient presented in referral from:  other  - Location of teledermatologist:  [facility] [place] (, [place], [place])  - Reason teledermatology is appropriate:  of National Emergency Regarding Coronavirus disease (COVID 19) Outbreak  - Method of transmission:  Store and Forward and Video ( Invitation sent by:  Geovani and send to e-mail at: [email or web address] )  - Date of images: per video and Epic media  - Service start time: 7: 18am  - Service end time:7:35am  - Date of report: 11/18/20      I spent a total of 17 minutes for telemedicine consult with Murtaza Arroyo during today s video meeting. Over  50% of this time was spent counseling the patient and/or coordinating care. Please see Assessment and Plan for details.

## 2020-11-18 NOTE — LETTER
11/18/2020         RE: Murtaza Arroyo  [address] [facility] [address]        Dear Colleague,    Thank you for referring your patient, Murtaza Arroyo, to the [facility] [place]. Please see a copy of my visit note below.    Note for return visit for Dermato-Allergy       Encounter Date: Nov 18, 2020    History of Present Illness:  I have reviewed the teledermatology  information and the nursing intake corresponding to this issue. Murtaza Arroyo is a 30 year old female who presents as a teledermatology consult for the following information take directly from the prior notes and video call performed by myself:       Additional comments and observations from review of the patient s chart including the following:    See notes    ROS: Patient generally feeling well today   Physical Examination:  General: Well-appearing, appropriately-developed individual.  - Skin: Examination of the skin on back within the teledermatology was performed.   See test results below  As above in HPI. No additional skin concerns.  - upper respiratory tract: currently no obvious Rhinitis  - lungs: no signs for active and present Asthma/Wheezing or coughing  - eyes: currently no active conjunctivits  - GI system/digestion: currently no problems, no bloating or Diarrhoea    Previous History of Present Illness:    Problems with the oral lesions started about 2 years ago with flare-ups that can last several days. Usually very red and swollen, no ulcers. Generally no pain or itchiness, but more numb. Tip of tongue can be very itchy    Numb feeling under tongue and cheek area with feeling of swelling, never breathing problems after tree nuts (almonds) and less peanuts. Can sometimes stay for 12h or more.    Patient has Rhinoconjunctivitis after contact with cat and more symptoms in summer (July-September) --> more runny nose  Never had Asthma    Has flexural eczema more in summer since childhood    The most recent flare up was about  6 weeks ago. Had treatment of root canal and 1 day later had under the tongue redness and swelling ==> this feeling was there for about 5 days    Patient had 2 years ago a biopsy and reacted there to the band aid and pain relief patch      Aunt and grandfather have Asthma    Past Medical History:   There is no problem list on file for this patient.    No past medical history on file.    Allergy History:   No Known Allergies    Social History:  The patient works as a  (computer). Patient has the following hobbies or non-occupational exposure: climbing and snow boarding      Family History:  No family history on file.    Medications:  Current Outpatient Medications   Medication Sig Dispense Refill     Cyanocobalamin (VITAMIN B 12) 100 MCG LOZG        Omega-3 Fatty Acids (FISH OIL) 500 MG CAPS          Review of Systems:  -As per HPI  -Constitutional: The patient denies fatigue, fevers, chills, unintended weight loss, and night sweats.  -HEENT: Patient denies nonhealing oral sores.  -Skin: As above in HPI. No additional skin concerns.    Physical exam:  Vitals: There were no vitals taken for this visit.  GEN: This is a well developed, well-nourished female in no acute distress, in a pleasant mood.    Skin: Focused examination of the oral cavity and face within the consultation was performed.   Mucus membrane diffusely erythematous and slightly atrophic, but no signs for erosions or ulcerations.   Chewing rim along the cheeks  For test results on arms see results below  As above in HPI. No additional skin concerns.  - upper respiratory tract: currently no obvious Rhinitis  - lungs: no signs for active and present Asthma/Wheezing or coughing  - eyes: currently no active conjunctivits  - GI system/digestion: currently no problems, no bloating or Diarrhoea      Allergy Tests:    Past Allergy Test: spec IgE in [place] from September 2020  All negatives to tuna, salmon, [place], cashew, hazelnut, sesame, scallop,  shrimp, soybean, milk, codfish, walnut, wheat, peanut, egg white,     Future Allergy Test: 11/16/2020       Order for PRICK TESTS    [x] Outpatient  [] Inpatient: [place]..../ Bed ....      Skin Atopy (atopic dermatitis) [x] Yes   [] No  Contact allergies:   [] Yes   [x] No  Urticaria/Angioedema  [x] Yes   [] No  Rhinitis/Sinusitis:   [x] Yes   [] No   [] seasonal [x] perennial            Allergic Asthma:   [] Yes   [x] No  Pets :  [x] Yes   [] No  Which? 2 cats and 1 dog (into bedroom and on bed)  Food Allergy:   [x] Yes   [] No tree nuts?  Drug allergies:   [] Yes   [x] No      Reason for tests (suspected allergy): oral allergy syndrome to tree nuts  Known previous allergies: none    Standardized prick panels  [x] Atopic panel (20 tests)  [] Pediatric Panel (12 tests)  [] Milk, Meat, Eggs, Grains (20 tests)   [] Dust, Epithelia, Feathers (10 tests)  [] Fish, Seafood, Shellfish (17 tests)  [x] Nuts, Beans (14 tests)  [] Spice, Vegetable, Fruit (17 tests)  [] Others: ...      [] Patient's own products: ...    DO NOT test if chemical or biological identity is unknown!     always ask from patient the product information and safety sheets (MSDS)     Order for PATCH TESTS  Reason for tests (suspected allergy): oral contact allergy  Known previous allergies: none    Standardized panels  [x] Standard panel (40 tests)  [x] Preservatives & Antimicrobials (31 tests)  [x] Emulsifiers & Additives (25 tests)   [x] Perfumes/Flavours & Plants (25 tests)  [] Hairdresser panel (12 tests)  [] Rubber Chemicals (22 tests)  [x] Plastics (26 tests)  [] Colorants/Dyes/Food additives (20 tests)  [] Metals (implants/dental) (24 tests)  [] Local anaesthetics/NSAIDs (14 tests)  [] Antibiotics & Antimycotics (14 tests)   [] Corticosteroids (15 tests)   [] Photopatch test (62 tests)   [] others: ...      Atopy Screen (Placed 11/16/20 )    No Substance Readings (15 min) Evaluation   POS Histamine 1mg/ml ++    NEG NaCl 0.9% -      No Substance  Readings (15 min) Evaluation   1 Alternaria alternata (tenuis)  -    2 Cladosporium herbarum -    3 Aspergillus fumigatus -    4 Penicillium notatum -    5 Dermatophagoides pteronyssinus -    6 Dermatophagoides farinae -    7 Dog epithelium (canis spp) -    8 Cat hair (denise catus) -    9 Cockroach   (Blatella americana & germanica) -    10 Grass mix midwest   (Sloane, Orchard, Redtop, Thanh) -    11 Bobby grass (sorghum halepense) -    12 Weed mix   (common Cocklebur, Lamb s quarters, rough redroot Pigweed, Dock/Sorrel) -    13 Mug wort (artemisia vulgare) -    14 Ragweed giant/short (ambrosia spp) -    15 English Plantain (plantago lanceolata) -    16 Tree mix 1 (Pecan, Maple BHR, Oak RVW, american Penn, black [place]ale) -    17 Red cedar (juniperus virginia) -    18 Tree mix 2   (white Kamari, river/red Birch, black Fargo, common Ren[place], american Elm) -    19 Box elder/Maple mix (acer spp) -    20 Aguada shagbark (carya ovata) -       -        No Substance Readings (15min) Evaluation   1 Reading (prunus dulcis) -    2 Brazil nut (bertholletia excels) -    3 Cashew nut (anacardium occidentale) -    4 Coconut (cocos nucifera) -    5 Hazelnut (corylus americana) -    6 Pecan (carya illinoinensis) -    7 Fargo (juglans regia) -    8 Pistachio nut (pistacia vera) -    9 Peanut (arachis hypogaea) -    10  Soybean (glycine max)                            -      Conclusion: no signs for specific immediate type hypersensitivity to any food allergen    RESULTS & EVALUATION of PATCH TESTS    Patch test readings after     [x] 2 days, [] 3 days [x] 4 days, [] 5 days,   Other duration: ...    Applied patch tests with results (import here the list of patch tests):    STANDARD Series      No Substance 2 days 4 days remarks   1 Atb Mix [C] - -    2 Colophony - -    3  2-Mercaptobenzothiazole  - -     4 Methylisothiazolinone - -    5 Carba Mix - -    6 Thiuram Mix [A] + -    7 Bisphenol A Epoxy Resin - -    8  E-Qiqf-Efzyqfbwxhf-Formaldehyde Resin - -    9 Mercapto Mix [A] - -    10 Black Rubber Mix- PPD [B] - -    11 Potassium Dichromate  -  -    12 Balsam of [place] (Myroxylon Pereirae Resin) +/++ -    13 Nickel Sulphate Hexahydrate - -    14 Mixed Dialkyl Thiourea - -    15 Paraben Mix [B] - -    16 Methyldibromo Glutaronitrile - -    17 Fragrance Mix ++ -    18 2-Bromo-2-Nitropropane-1,3-Diol (Bronopol) - -    19 Lyral - -    20 Tixocortol-21- Pivalate - -    21 Diazolidiyl Urea (Germall II) - -    22 Methyl Methacrylate - -    23 Cobalt (II) Chloride Hexahydrate - -    24 Fragrance Mix II  - -    25 Compositae Mix - -    26 Benzoyl Peroxide - -    27 Bacitracin - -    28 Formaldehyde - -    29 Methylchloroisothiazolinone / Methylisothiazolinone - -    30 Corticosteroid Mix - -    31 Sodium Lauryl Sulfate - -    32 Lanolin Alcohol - -    33 Turpentine - -    34 Cetylstearylalcohol - -    35 Chlorhexidine Dicluconate - -    36 Budenoside - -    37 Imidazolidinyl Urea  - -    38 Ethyl-2 Cyanoacrylate - -    39 Quaternium 15 (Dowicil 200) - -    40 Decyl Glucoside - -      PLASTICS     No Substance 2 days 4 days remarks     Acrylates - -    41 1 2-Hydroxyethyl Methacrylate (HEMA) - -    42 2 1,4-Butandioldimethacrylate (BUDMA) - -    43 3  2-Ethylhexyl Acrylate - -    44 4 Bisphenol-A-Dimethacrylate  - -    45 5 Diurethane-Dimethacrylate - -    46 6 Ethyleneglycoldimethacrylate (EGDMA) - -    47 7 Pentaerythritoltriacrylate (WON) - -    48 8 Triethylene Glycol Dimethacrylate (TEGDMA) - -      Synthetic material/additives       49 9 I-Pftx-Revsuvvgafz - -    50 10 Tricresyl Phosphate - -    51 11 9-Zdnl-Ffkwecevlxuhk - -    52 12 Bis (2-Ethylhexyl) Phthalate - -    53 13 Dibutylphthalate - -    54 14 Dimethylphthalate - -    55 15 Toluene-2,4-Diisocyanate - -    56 16 Diphenylmethane-4,4''-Diisocyanate - -      EPOXY RESIN SYSTEMS        Reactive Solvents - -    57 17 Cresyl Glycidyl Ether - -    58 18 Butyl Glycidyl Ether  - -    59 19 Phenyl Glycidyl Ether - -    60 20 1,4-Butanediol Diglycidyl Ether - -    61 21 1,6-Hexanediole Diglycidyl Ether - -      Hardener / Accelerator - -    62 22 Triethylenetetramine - -    63 23 Diethylenetriamine - -    64 24 Isophorone Diamine (IPD) - -    65 25 N,N-Dimethyl-P-Toluidine - -      EMULSIFIERS & ADDITIVES   No Substance 2 days 4 days remarks   66 1 Polyethylene Glycol-400 - -    67 2 Cocamidopropyl Betaine - -    68 3 Amerchol L101 - -    69 4 Propylene Glycol - -    70 5 Triethanolamine - -    71 6 Sorbitane Sesquiolate - -    72 7 Isopropylmyristate - -    73 8 Polysorbate 80  - -    74 9 Amidoamine   (Stearamidopropyl Dimethylamine) - -    75 10 Oleamidopropyl Dimethylamine - -    76 11 Lauryl Glucoside - -    77 12 Coconut Diethanolamide  - -    78 13 2-Hydroxy-4-Methoxy Benzophenone (Oxybenzone) - -    79 14 Benzophenone-4 (Sulisobenzon) - -    80 15 Propolis - -    81 16 Dexpanthenol - -    82 17 Carboxymethyl Cellulose Sodium - -    83 18 Abitol - -    84 19 Tert-Butylhydroquinone - -    85 20 Benzyl Salicylate - -      Antioxidant      86 21 Dodecyl Gallate - -    87 22 Butylhydroxyanisole (BHA) - -    88 23 Butylhydroxytoluene (BHT) - -    89 24 Di-Alpha-Tocopherol (Vit E) - -    90 25 Propyl Gallate - -    91 26 Zinc Pyrithione      92 27 Dimethylaminopropylamin (DMAPA)        PRESERVATIVES & ANTIMICROBIALS     No Substance 2 days 4 days remarks   93 1  1,2-Benzisothiazoline-3-One, Sodium Salt - -    94 2  1,3,5-Renard (2-Hydroxyethyl) - Hexahydrotriazine (Grotan BK) - -    95 3 7-Btsdytafvuvwe-3-Nitro-1, 3-Propanediol - -    96 4  3, 4, 4' - Triclocarban - -    97 5 4 - Chloro - 3 - Cresol - -    98 6 4 - Chloro - 4 - Xylenol (PCMX) - -    99 7 7-Ethylbicyclooxazolidine (Bioban [id number]) - -    100 8 Benzalkonium Chloride - -    101 9 Benzyl Alcohol - -    102 10 Cetalkonium Chloride - -    103 11 Cetylpyrimidine Chloride  - -    104 12 Chloroacetamide - -    105 13 DMDM Hydantoin -  -    106 14 Glutaraldehyde - -    107 15 Triclosan - -    108 16 Glyoxal Trimeric Dihydrate - -    109 17 Iodopropynyl Butylcarbamate - -    110 18 Octylisothiazoline - -    111 19 Iodoform - -    112 20 (Nitrobutyl) Morpholine/(Ethylnitro-Trimethylene) Dimorpholine (Bioban P 1487) - -    113 21 Phenoxyethanol - -    114 22 Phenyl Salicylate - -    115 23 Povidone Iodine - -    116 24 Sodium Benzoate - -    117 25 Sodium Disulfite - -    118 26 Sorbic Acid - -    129 27 Thimerosal - -    120 28 Melamine Formeladyde Resin      121 29 Ethylenediamine Dihydrochloride        Parabens      123 30 Butyl-P-Hydroxybenzoate - -    123 31 Ethyl-P-Hydroxybenzoate - -    124 32 Methyl-P-Hydroxybenzoate - -    125 33 Propyl-P-Hydroxybenzoate - -      PERFUMES, FLAVORS & PLANTS      No Substance 2 days 4 days remarks   126 1 Benzyl Cinnamate - -    127 2 Di-Limonene (Dipentene) - -    128 3 Cananga Odorata (Toni Muñoz) (I) - -    129 4 Lichen Acid Mix - -    130 5 Mentha Piperita Oil (Peppermint Oil) - -    131 6 Sesquiterpenelactone mix - -    132 7 Tea Tree Oil, Oxidized - -    133 8 Wood Tar Mix - -    134 9 Abietic Acid - -    135 10 Lavendula Angustifolia Oil (Lavender Oil) - -    136 11 Camphor  - -      Fragrance Mix I      137 12 Oakmoss Absolute - -    138 13 Eugenol - -    139 14 Geraniol - -    140 15 Hydroxycitronellal - -    141 16 Isoeugenol - -    142 17 Cinnamic Aldehyde ++ -    143 18 Cinnamic Alcohol  - -      Fragrance mix II      144 19 Citronellol - -    145 20 Alpha-Hexylcinnamic Aldehyde    - -    146 21 Citral - -    147 22 Farnesol - -    148 23 Coumarin - -        OTHER PRODUCTS      No Substance Conc  % solv 2 days 4 days remarks    1          2          3          4          5          6          7          8          9          10           Results of patch tests:                         Interpretation:    - Negative                    A    = Allergic      (+) Erythema    TI   = Toxic/irritant   + E  + Infiltration    RaP = Relevance at Present     ++ E/I + Papulovesicle   Rpr  = Relevance Previously     +++ E/I/P + Blister     nR   = No Relevance      [] No relevant allergic reaction observed    [x] Allergic reaction diagnosed against following allergens: Balsam of Peru++, fragrance mix ++, Cinnamic aldehyde ++, Thiuram mix      Interpretation/ remarks:   See later    [] Patient information given   [] ACDS CAMP information's (# ....) to following compounds: .....   [] General information's to following compounds: ......    ==> final Diagnosis:    # Atopic predisposition with:    Flexural eczema since childhood    seasona Rhinitis July to September    RC after contact with cat    Possible oral allergy syndrome to tree nuts (Almonds), less peanuts, not soy    # possible delayed type hypersensitivity to tooth paste or mouth water and oral symptoms    Also reactions to band aid  --> perform patch tests      These conclusions are made at the best of one's knowledge and belief based on the provided evidence such as patient's history and allergy test results and they can change over time or can be incomplete because of missing information's.    ==> Treatment prescribed/Plan:    Patient counseling:  Explained the different types of tests      Follow-up: in Derm-Allergy clinic for readings of patch tests for 2nd readings after 4 days      Follow-up: in Derm-Allergy clinic for 2nd readings of patch tests after 4 days and final conclusions      Thank you for the opportunity be involved in the care of this patient.     Staff: Uzma Cameron LPN    _____________________________________________________________________________    Teledermatology information:  - Location of patient: Home  - Patient presented in referral from:  other  - Location of teledermatologist:  [facility] [place] (, [place], [place])  - Reason teledermatology is appropriate:  of National Emergency Regarding Coronavirus  "disease (COVID 19) Outbreak  - Method of transmission:  Store and Forward and Video ( Invitation sent by:  Geovani and send to e-mail at: [email or web address] )  - Date of images: per video and Epic media  - Service start time: 7: 18am  - Service end time:7:35am  - Date of report: 11/18/20      I spent a total of 17 minutes for telemedicine consult with Murtaza Arroyo during today s video meeting. Over 50% of this time was spent counseling the patient and/or coordinating care. Please see Assessment and Plan for details.     Murtaza Arroyo is a 30 year old female who is being evaluated via a billable video visit.      The patient has been notified of following:     \"This video visit will be conducted via a call between you and your physician/provider. We have found that certain health care needs can be provided without the need for an in-person physical exam.  This service lets us provide the care you need with a video conversation.  If a prescription is necessary we can send it directly to your pharmacy.  If lab work is needed we can place an order for that and you can then stop by our lab to have the test done at a later time.    Video visits are billed at different rates depending on your insurance coverage.  Please reach out to your insurance provider with any questions.    If during the course of the call the physician/provider feels a video visit is not appropriate, you will not be charged for this service.\"    Patient has given verbal consent for Video visit? Yes2  How would you like to obtain your AVS? MyChart  If you are dropped from the video visit, the video invite should be resent to: Send to e-mail at: [email or web address]  Will anyone else be joining your video visit? No        Video-Visit Details    Type of service:  Video Visit    Originating Location (pt. Location): Home    Distant Location (provider location):  [facility]     Platform used for Video Visit: Geovani Gusman " CLEMENTE Cameron            Again, thank you for allowing me to participate in the care of your patient.        Sincerely,        Eduardo Reagan MD

## 2020-11-20 ENCOUNTER — OFFICE VISIT (OUTPATIENT)
Dept: ALLERGY | Facility: CLINIC | Age: 30
End: 2020-11-20
Payer: COMMERCIAL

## 2020-11-20 DIAGNOSIS — L23.89 ALLERGIC CONTACT DERMATITIS DUE TO OTHER AGENTS: Primary | ICD-10-CM

## 2020-11-20 DIAGNOSIS — Z91.018 FOOD ALLERGY: ICD-10-CM

## 2020-11-20 PROCEDURE — 99214 OFFICE O/P EST MOD 30 MIN: CPT | Performed by: DERMATOLOGY

## 2020-11-20 ASSESSMENT — PAIN SCALES - GENERAL: PAINLEVEL: NO PAIN (0)

## 2020-11-20 NOTE — PROGRESS NOTES
Note for return visit for Dermato-Allergy       Encounter Date: Nov 20, 2020    History of Present Illness:  Murtaza Arroyo is a 30 year old female who presents in person to the consult following information has been take directly from the prior notes, patients informations, Epic and/or other sources and exam/history performed by myself:       Additional comments and observations from review of the patient s chart including the following:    See notes for more details    ROS: Patient generally feeling well today   Physical Examination:  General: Well-appearing, appropriately-developed individual.  - Skin: Focused examination of the test sites on back within the consultation was performed.   See test results below  As above in HPI. No additional skin concerns.  - upper respiratory tract: currently no obvious Rhinitis  - lungs: no signs for active and present Asthma/Wheezing or coughing  - eyes: currently no active conjunctivits  - GI system/digestion: currently no problems, no bloating or Diarrhoea        Earlier History and Allergy exams:    Problems with the oral lesions started about 2 years ago with flare-ups that can last several days. Usually very red and swollen, no ulcers. Generally no pain or itchiness, but more numb. Tip of tongue can be very itchy    Numb feeling under tongue and cheek area with feeling of swelling, never breathing problems after tree nuts (almonds) and less peanuts. Can sometimes stay for 12h or more.    Patient has Rhinoconjunctivitis after contact with cat and more symptoms in summer (July-September) --> more runny nose  Never had Asthma    Has flexural eczema more in summer since childhood    The most recent flare up was about 6 weeks ago. Had treatment of root canal and 1 day later had under the tongue redness and swelling ==> this feeling was there for about 5 days    Patient had 2 years ago a biopsy and reacted there to the band aid and pain relief patch    Aunt and grandfather have  Asthma    Past Medical History:   There is no problem list on file for this patient.    No past medical history on file.    Allergy History:   No Known Allergies    Social History:  The patient works as a  (computer). Patient has the following hobbies or non-occupational exposure: climbing and snow boarding      Family History:  No family history on file.    Medications:  Current Outpatient Medications   Medication Sig Dispense Refill     Cyanocobalamin (VITAMIN B 12) 100 MCG LOZG        Omega-3 Fatty Acids (FISH OIL) 500 MG CAPS          Review of Systems:  -As per HPI  -Constitutional: The patient denies fatigue, fevers, chills, unintended weight loss, and night sweats.  -HEENT: Patient denies nonhealing oral sores.  -Skin: As above in HPI. No additional skin concerns.    Physical exam:  Vitals: There were no vitals taken for this visit.  GEN: This is a well developed, well-nourished female in no acute distress, in a pleasant mood.    Skin: Focused examination of the oral cavity and face within the consultation was performed.   Mucus membrane diffusely erythematous and slightly atrophic, but no signs for erosions or ulcerations.   Chewing rim along the cheeks  For test results on arms see results below  As above in HPI. No additional skin concerns.  - upper respiratory tract: currently no obvious Rhinitis  - lungs: no signs for active and present Asthma/Wheezing or coughing  - eyes: currently no active conjunctivits  - GI system/digestion: currently no problems, no bloating or Diarrhoea      Allergy Tests:    Past Allergy Test: spec IgE in [place] from September 2020  All negatives to tuna, salmon, [place], cashew, hazelnut, sesame, scallop, shrimp, soybean, milk, codfish, walnut, wheat, peanut, egg white,     Future Allergy Test: 11/16/2020       Order for PRICK TESTS    [x] Outpatient  [] Inpatient: [facility]..../ Bed ....      Skin Atopy (atopic dermatitis) [x] Yes   [] No  Contact allergies:   [] Yes    [x] No  Urticaria/Angioedema  [x] Yes   [] No  Rhinitis/Sinusitis:   [x] Yes   [] No   [] seasonal [x] perennial            Allergic Asthma:   [] Yes   [x] No  Pets :  [x] Yes   [] No  Which? 2 cats and 1 dog (into bedroom and on bed)  Food Allergy:   [x] Yes   [] No tree nuts?  Drug allergies:   [] Yes   [x] No      Reason for tests (suspected allergy): oral allergy syndrome to tree nuts  Known previous allergies: none    Standardized prick panels  [x] Atopic panel (20 tests)  [] Pediatric Panel (12 tests)  [] Milk, Meat, Eggs, Grains (20 tests)   [] Dust, Epithelia, Feathers (10 tests)  [] Fish, Seafood, Shellfish (17 tests)  [x] Nuts, Beans (14 tests)  [] Spice, Vegetable, Fruit (17 tests)  [] Others: ...      [] Patient's own products: ...    DO NOT test if chemical or biological identity is unknown!     always ask from patient the product information and safety sheets (MSDS)     Order for PATCH TESTS  Reason for tests (suspected allergy): oral contact allergy  Known previous allergies: none    Standardized panels  [x] Standard panel (40 tests)  [x] Preservatives & Antimicrobials (31 tests)  [x] Emulsifiers & Additives (25 tests)   [x] Perfumes/Flavours & Plants (25 tests)  [] Hairdresser panel (12 tests)  [] Rubber Chemicals (22 tests)  [x] Plastics (26 tests)  [] Colorants/Dyes/Food additives (20 tests)  [] Metals (implants/dental) (24 tests)  [] Local anaesthetics/NSAIDs (14 tests)  [] Antibiotics & Antimycotics (14 tests)   [] Corticosteroids (15 tests)   [] Photopatch test (62 tests)   [] others: ...      Atopy Screen (Placed 11/16/20 )    No Substance Readings (15 min) Evaluation   POS Histamine 1mg/ml ++    NEG NaCl 0.9% -      No Substance Readings (15 min) Evaluation   1 Alternaria alternata (tenuis)  -    2 Cladosporium herbarum -    3 Aspergillus fumigatus -    4 Penicillium notatum -    5 Dermatophagoides pteronyssinus -    6 Dermatophagoides farinae -    7 Dog epithelium (canis spp) -    8 Cat hair  (denise catus) -    9 Cockroach   (Blatella americana & germanica) -    10 Grass mix midwest   (Sloane, Orchard, Redtop, Thanh) -    11 Bobby grass (sorghum halepense) -    12 Weed mix   (common Cocklebur, Lamb s quarters, rough redroot Pigweed, Dock/Sorrel) -    13 Mug wort (artemisia vulgare) -    14 Ragweed giant/short (ambrosia spp) -    15 English Plantain (plantago lanceolata) -    16 Tree mix 1 (Pecan, Maple BHR, Oak RVW, american Bayard, black Campbell) -    17 Red cedar (juniperus virginia) -    18 Tree mix 2   (white Kamari, river/red Birch, black Lak[place], common Wilkin, american Elm) -    19 Box elder/Maple mix (acer spp) -    20 Gregory shagbark (carya ovata) -       -        No Substance Readings (15min) Evaluation   1 [place] (prunus dulcis) -    2 Brazil nut (bertholletia excels) -    3 Cashew nut (anacardium occidentale) -    4 Coconut (cocos nucifera) -    5 Hazelnut (corylus americana) -    6 Pecan (carya illinoinensis) -    7 Lakeland (juglans regia) -    8 Pistachio nut (pistacia vera) -    9 Peanut (arachis hypogaea) -    10  Soybean (glycine max)          Conclusion: no signs for specific immediate type hypersensitivity to any food allergen    RESULTS & EVALUATION of PATCH TESTS    Patch test readings after     [x] 2 days, [] 3 days [x] 4 days, [] 5 days,    Applied patch tests with results (import here the list of patch tests):    STANDARD Series      No Substance 2 days 4 days remarks   1 Tab Mix [C] - -    2 Colophony - -    3  2-Mercaptobenzothiazole  - -     4 Methylisothiazolinone - -    5 Carba Mix - -    6 Thiuram Mix [A] + -    7 Bisphenol A Epoxy Resin - -    8 Y-Zccr-Rdjvlnttvsw-Formaldehyde Resin - -    9 Mercapto Mix [A] - -    10 Black Rubber Mix- PPD [B] - -    11 Potassium Dichromate  -  -    12 Balsam of [place] (Myroxylon Pereirae Resin) +/++ +    13 Nickel Sulphate Hexahydrate - -    14 Mixed Dialkyl Thiourea - -    15 Paraben Mix [B] - -    16 Methyldibromo Glutaronitrile - -     17 Fragrance Mix ++ +/++    18 2-Bromo-2-Nitropropane-1,3-Diol (Bronopol) - -    19 Lyral - -    20 Tixocortol-21- Pivalate - -    21 Diazolidiyl Urea (Germall II) - -    22 Methyl Methacrylate - -    23 Cobalt (II) Chloride Hexahydrate - -    24 Fragrance Mix II  - -    25 Compositae Mix - -    26 Benzoyl Peroxide - -    27 Bacitracin - -    28 Formaldehyde - -    29 Methylchloroisothiazolinone / Methylisothiazolinone - -    30 Corticosteroid Mix - -    31 Sodium Lauryl Sulfate - -    32 Lanolin Alcohol - -    33 Turpentine - -    34 Cetylstearylalcohol - -    35 Chlorhexidine Dicluconate - -    36 Budenoside - -    37 Imidazolidinyl Urea  - -    38 Ethyl-2 Cyanoacrylate - -    39 Quaternium 15 (Dowicil 200) - -    40 Decyl Glucoside - -      PLASTICS     No Substance 2 days 4 days remarks     Acrylates - -    41 1 2-Hydroxyethyl Methacrylate (HEMA) - -    42 2 1,4-Butandioldimethacrylate (BUDMA) - -    43 3  2-Ethylhexyl Acrylate - -    44 4 Bisphenol-A-Dimethacrylate  - -    45 5 Diurethane-Dimethacrylate NA NA    46 6 Ethyleneglycoldimethacrylate (EGDMA) - -    47 7 Pentaerythritoltriacrylate (WON) - -    48 8 Triethylene Glycol Dimethacrylate (TEGDMA) - -      Synthetic material/additives       49 9 R-Btfi-Nqcvwhnqqpw - -    50 10 Tricresyl Phosphate - -    51 11 4-Pgmk-Cldwmroolctvg - -    52 12 Bis (2-Ethylhexyl) Phthalate - -    53 13 Dibutylphthalate - -    54 14 Dimethylphthalate - -    55 15 Toluene-2,4-Diisocyanate NA NA    56 16 Diphenylmethane-4,4''-Diisocyanate - -      EPOXY RESIN SYSTEMS        Reactive Solvents - -    57 17 Cresyl Glycidyl Ether - -    58 18 Butyl Glycidyl Ether - -    59 19 Phenyl Glycidyl Ether - -    60 20 1,4-Butanediol Diglycidyl Ether - -    61 21 1,6-Hexanediole Diglycidyl Ether - -      Hardener / Accelerator - -    62 22 Triethylenetetramine - -    63 23 Diethylenetriamine - -    64 24 Isophorone Diamine (IPD) - -    65 25 N,N-Dimethyl-P-Toluidine - -      EMULSIFIERS &  ADDITIVES   No Substance 2 days 4 days remarks   66 1 Polyethylene Glycol-400 - -    67 2 Cocamidopropyl Betaine - -    68 3 Amerchol L101 - -    69 4 Propylene Glycol - -    70 5 Triethanolamine - -    71 6 Sorbitane Sesquiolate - -    72 7 Isopropylmyristate - -    73 8 Polysorbate 80  - -    74 9 Amidoamine   (Stearamidopropyl Dimethylamine) - -    75 10 Oleamidopropyl Dimethylamine - -    76 11 Lauryl Glucoside - -    77 12 Coconut Diethanolamide  - -    78 13 2-Hydroxy-4-Methoxy Benzophenone (Oxybenzone) - -    79 14 Benzophenone-4 (Sulisobenzon) - -    80 15 Propolis - -    81 16 Dexpanthenol - -    82 17 Carboxymethyl Cellulose Sodium NA NA    83 18 Abitol - -    84 19 Tert-Butylhydroquinone - -    85 20 Benzyl Salicylate - -      Antioxidant      86 21 Dodecyl Gallate - -    87 22 Butylhydroxyanisole (BHA) - -    88 23 Butylhydroxytoluene (BHT) - -    89 24 Di-Alpha-Tocopherol (Vit E) - -    90 25 Propyl Gallate - -    91 26 Zinc Pyrithione NA NA    92 27 Dimethylaminopropylamin (DMAPA)        PRESERVATIVES & ANTIMICROBIALS     No Substance 2 days 4 days remarks   93 1  1,2-Benzisothiazoline-3-One, Sodium Salt - -    94 2  1,3,5-Renard (2-Hydroxyethyl) - Hexahydrotriazine (Grotan BK) - -    95 3 6-Udsmcvmwrzmji-3-Nitro-1, 3-Propanediol - -    96 4  3, 4, 4' - Triclocarban - -    97 5 4 - Chloro - 3 - Cresol - -    98 6 4 - Chloro - 4 - Xylenol (PCMX) - -    99 7 7-Ethylbicyclooxazolidine (Bioban [id number]) - -    100 8 Benzalkonium Chloride NA NA    101 9 Benzyl Alcohol - -    102 10 Cetalkonium Chloride - -    103 11 Cetylpyrimidine Chloride  - -    104 12 Chloroacetamide - -    105 13 DMDM Hydantoin - -    106 14 Glutaraldehyde - -    107 15 Triclosan - -    108 16 Glyoxal Trimeric Dihydrate - -    109 17 Iodopropynyl Butylcarbamate - -    110 18 Octylisothiazoline - -    111 19 Iodoform NA NA    112 20 (Nitrobutyl) Morpholine/(Ethylnitro-Trimethylene) Dimorpholine (Bioban P 1487) - -    113 21 Phenoxyethanol -  -    114 22 Phenyl Salicylate - -    115 23 Povidone Iodine - -    116 24 Sodium Benzoate - -    117 25 Sodium Disulfite - -    118 26 Sorbic Acid - -    129 27 Thimerosal - -    120 28 Melamine Formeladyde Resin      121 29 Ethylenediamine Dihydrochloride        Parabens      123 30 Butyl-P-Hydroxybenzoate - -    123 31 Ethyl-P-Hydroxybenzoate - -    124 32 Methyl-P-Hydroxybenzoate - -    125 33 Propyl-P-Hydroxybenzoate - -      PERFUMES, FLAVORS & PLANTS      No Substance 2 days 4 days remarks   126 1 Benzyl Cinnamate - -    127 2 Di-Limonene (Dipentene) - -    128 3 [place] Odorata (Ylang Ylang) (I) - -    129 4 Lichen Acid Mix - -    130 5 Mentha Piperita Oil (Peppermint Oil) - -    131 6 Sesquiterpenelactone mix - -    132 7 Tea Tree Oil, Oxidized - -    133 8 Wood Tar Mix - +    134 9 Abietic Acid - -    135 10 Lavendula Angustifolia Oil (Lavender Oil) - -    136 11 Camphor  NA NA      Fragrance Mix I      137 12 Oakmoss Absolute - -    138 13 Eugenol - -    139 14 Geraniol - -    140 15 Hydroxycitronellal - -    141 16 Isoeugenol - -    142 17 Cinnamic Aldehyde ++ +/++    143 18 Cinnamic Alcohol  - -      Fragrance mix II      144 19 Citronellol - -    145 20 Alpha-Hexylcinnamic Aldehyde    - -    146 21 Citral - -    147 22 Farnesol - -    148 23 Coumarin - -      Results of patch tests:                         Interpretation:    - Negative                    A    = Allergic      (+) Erythema    TI   = Toxic/irritant   + E + Infiltration    RaP = Relevance at Present     ++ E/I + Papulovesicle   Rpr  = Relevance Previously     +++ E/I/P + Blister     nR   = No Relevance      [x] Allergic reaction diagnosed against following allergens: Balsam of [place]++, fragrance mix ++, Cinnamic aldehyde ++, Wood tar mix      Interpretation/ remarks:   It could be that the oral problem is linked to a contact allergy to various fragrances (fragrance mix I contains cinnamic aldehyde and this explains and re-inforces the  allergy to fragrances in dental products). Could be also the fragrances in dental products during dental procedures. Probably the seasoning to nuts    [x] Patient information given   [x] ACDS CAMP information's (# [id number], and [id number]) to following compounds:  Balsam of [place], fragrance mix, cinnamic aldehyde, wood tar mix   [x] General information's to following compounds: cinnamic aldehyde and balsam of peru    ==> final Diagnosis:    # Atopic predisposition with:    Flexural eczema since childhood    seasonal Rhinitis July to September    RC after contact with cat    Possible oral allergy syndrome to tree nuts (Almonds), less peanuts, not soy    # possible delayed type hypersensitivity to tooth paste or mouth water and oral symptoms    Also reactions to band aid  --> perform patch tests      These conclusions are made at the best of one's knowledge and belief based on the provided evidence such as patient's history and allergy test results and they can change over time or can be incomplete because of missing information's.      Patient counseling:  Use CAMP Fozia and avoid dental and oral products with lots of flavor and fragrances      Follow-up: in Derm-Allergy clinic if necessary    Thank you for the opportunity be involved in the care of this patient.     Staff: Uzma Cameron LPN    I spent a total of 37 minutes face to face with Murtaza Arroyo during today s office visit. Over 50% of this time was spent reading the patch tests, discussing all the test results, correlating them to the clinical relevance, counseling the patient and/or coordinating care. Moreover time was spent to install and explain the CAMP Fozia. Please see Assessment and Plan for details.

## 2020-11-20 NOTE — NURSING NOTE
Allergy Rooming Note    Murtaza Arroyo's goals for this visit include:   Chief Complaint   Patient presents with     Allergy Testing Followup     Murtaza is here for allergy testing day 5     Uzma Cameron LPN

## 2020-11-20 NOTE — LETTER
11/20/2020         RE: Murtaza Arroyo  [address] [facility] [address]        Dear Colleague,    Thank you for referring your patient, Murtaza Arroyo, to the [facility] [place]. Please see a copy of my visit note below.    Note for return visit for Dermato-Allergy       Encounter Date: Nov 20, 2020    History of Present Illness:  Murtaza Arroyo is a 30 year old female who presents in person to the consult following information has been take directly from the prior notes, patients informations, Epic and/or other sources and exam/history performed by myself:       Additional comments and observations from review of the patient s chart including the following:    See notes for more details    ROS: Patient generally feeling well today   Physical Examination:  General: Well-appearing, appropriately-developed individual.  - Skin: Focused examination of the test sites on back within the consultation was performed.   See test results below  As above in HPI. No additional skin concerns.  - upper respiratory tract: currently no obvious Rhinitis  - lungs: no signs for active and present Asthma/Wheezing or coughing  - eyes: currently no active conjunctivits  - GI system/digestion: currently no problems, no bloating or Diarrhoea        Earlier History and Allergy exams:    Problems with the oral lesions started about 2 years ago with flare-ups that can last several days. Usually very red and swollen, no ulcers. Generally no pain or itchiness, but more numb. Tip of tongue can be very itchy    Numb feeling under tongue and cheek area with feeling of swelling, never breathing problems after tree nuts (almonds) and less peanuts. Can sometimes stay for 12h or more.    Patient has Rhinoconjunctivitis after contact with cat and more symptoms in summer (July-September) --> more runny nose  Never had Asthma    Has flexural eczema more in summer since childhood    The most recent flare up was about 6 weeks ago. Had  treatment of root canal and 1 day later had under the tongue redness and swelling ==> this feeling was there for about 5 days    Patient had 2 years ago a biopsy and reacted there to the band aid and pain relief patch    Aunt and grandfather have Asthma    Past Medical History:   There is no problem list on file for this patient.    No past medical history on file.    Allergy History:   No Known Allergies    Social History:  The patient works as a  (computer). Patient has the following hobbies or non-occupational exposure: climbing and snow boarding      Family History:  No family history on file.    Medications:  Current Outpatient Medications   Medication Sig Dispense Refill     Cyanocobalamin (VITAMIN B 12) 100 MCG LOZG        Omega-3 Fatty Acids (FISH OIL) 500 MG CAPS          Review of Systems:  -As per HPI  -Constitutional: The patient denies fatigue, fevers, chills, unintended weight loss, and night sweats.  -HEENT: Patient denies nonhealing oral sores.  -Skin: As above in HPI. No additional skin concerns.    Physical exam:  Vitals: There were no vitals taken for this visit.  GEN: This is a well developed, well-nourished female in no acute distress, in a pleasant mood.    Skin: Focused examination of the oral cavity and face within the consultation was performed.   Mucus membrane diffusely erythematous and slightly atrophic, but no signs for erosions or ulcerations.   Chewing rim along the cheeks  For test results on arms see results below  As above in HPI. No additional skin concerns.  - upper respiratory tract: currently no obvious Rhinitis  - lungs: no signs for active and present Asthma/Wheezing or coughing  - eyes: currently no active conjunctivits  - GI system/digestion: currently no problems, no bloating or Diarrhoea      Allergy Tests:    Past Allergy Test: spec IgE in [place] from September 2020  All negatives to tuna, salmon, [place], cashew, hazelnut, sesame, scallop, shrimp, soybean,  milk, codfish, walnut, wheat, peanut, egg white,     Future Allergy Test: 11/16/2020       Order for PRICK TESTS    [x] Outpatient  [] Inpatient: [place]..../ Bed ....      Skin Atopy (atopic dermatitis) [x] Yes   [] No  Contact allergies:   [] Yes   [x] No  Urticaria/Angioedema  [x] Yes   [] No  Rhinitis/Sinusitis:   [x] Yes   [] No   [] seasonal [x] perennial            Allergic Asthma:   [] Yes   [x] No  Pets :  [x] Yes   [] No  Which? 2 cats and 1 dog (into bedroom and on bed)  Food Allergy:   [x] Yes   [] No tree nuts?  Drug allergies:   [] Yes   [x] No      Reason for tests (suspected allergy): oral allergy syndrome to tree nuts  Known previous allergies: none    Standardized prick panels  [x] Atopic panel (20 tests)  [] Pediatric Panel (12 tests)  [] Milk, Meat, Eggs, Grains (20 tests)   [] Dust, Epithelia, Feathers (10 tests)  [] Fish, Seafood, Shellfish (17 tests)  [x] Nuts, Beans (14 tests)  [] Spice, Vegetable, Fruit (17 tests)  [] Others: ...      [] Patient's own products: ...    DO NOT test if chemical or biological identity is unknown!     always ask from patient the product information and safety sheets (MSDS)     Order for PATCH TESTS  Reason for tests (suspected allergy): oral contact allergy  Known previous allergies: none    Standardized panels  [x] Standard panel (40 tests)  [x] Preservatives & Antimicrobials (31 tests)  [x] Emulsifiers & Additives (25 tests)   [x] Perfumes/Flavours & Plants (25 tests)  [] Hairdresser panel (12 tests)  [] Rubber Chemicals (22 tests)  [x] Plastics (26 tests)  [] Colorants/Dyes/Food additives (20 tests)  [] Metals (implants/dental) (24 tests)  [] Local anaesthetics/NSAIDs (14 tests)  [] Antibiotics & Antimycotics (14 tests)   [] Corticosteroids (15 tests)   [] Photopatch test (62 tests)   [] others: ...      Atopy Screen (Placed 11/16/20 )    No Substance Readings (15 min) Evaluation   POS Histamine 1mg/ml ++    NEG NaCl 0.9% -      No Substance Readings (15 min)  Evaluation   1 Alternaria alternata (tenuis)  -    2 Cladosporium herbarum -    3 Aspergillus fumigatus -    4 Penicillium notatum -    5 Dermatophagoides pteronyssinus -    6 Dermatophagoides farinae -    7 Dog epithelium (canis spp) -    8 Cat hair (denise catus) -    9 Cockroach   (Blatella americana & germanica) -    10 Grass mix midwest   (Sloane, Orchard, Redtop, Thanh) -    11 Bobby grass (sorghum halepense) -    12 Weed mix   (common Cocklebur, Lamb s quarters, rough redroot Pigweed, Dock/Sorrel) -    13 Mug wort (artemisia vulgare) -    14 Ragweed giant/short (ambrosia spp) -    15 English Plantain (plantago lanceolata) -    16 Tree mix 1 (Pecan, Maple BHR, Oak RVW, american [place], black [place]) -    17 Red cedar (juniperus virginia) -    18 Tree mix 2   (white Kamari, river/red Birch, black [place], common [place]gett, american Elm) -    19 Box elder/Maple mix (acer spp) -    20 [place] shagbark (carya ovata) -       -        No Substance Readings (15min) Evaluation   1 [place] (prunus dulcis) -    2 Brazil nut (bertholletia excels) -    3 Cashew nut (anacardium occidentale) -    4 Coconut (cocos nucifera) -    5 Hazelnut (corylus americana) -    6 Pecan (carya illinoinensis) -    7 [place] (juglans regia) -    8 Pistachio nut (pistacia vera) -    9 Peanut (arachis hypogaea) -    10  Soybean (glycine max)          Conclusion: no signs for specific immediate type hypersensitivity to any food allergen    RESULTS & EVALUATION of PATCH TESTS    Patch test readings after     [x] 2 days, [] 3 days [x] 4 days, [] 5 days,    Applied patch tests with results (import here the list of patch tests):    STANDARD Series      No Substance 2 days 4 days remarks   1 Tab Mix [C] - -    2 Colophony - -    3  2-Mercaptobenzothiazole  - -     4 Methylisothiazolinone - -    5 Carba Mix - -    6 Thiuram Mix [A] + -    7 Bisphenol A Epoxy Resin - -    8 G-Qmcc-Uutqofsbjxv-Formaldehyde Resin - -    9 Mercapto Mix [A] - -    10  Black Rubber Mix- PPD [B] - -    11 Potassium Dichromate  -  -    12 Balsam of [place] (Myroxylon Pereirae Resin) +/++ +    13 Nickel Sulphate Hexahydrate - -    14 Mixed Dialkyl Thiourea - -    15 Paraben Mix [B] - -    16 Methyldibromo Glutaronitrile - -    17 Fragrance Mix ++ +/++    18 2-Bromo-2-Nitropropane-1,3-Diol (Bronopol) - -    19 Lyral - -    20 Tixocortol-21- Pivalate - -    21 Diazolidiyl Urea (Germall II) - -    22 Methyl Methacrylate - -    23 Cobalt (II) Chloride Hexahydrate - -    24 Fragrance Mix II  - -    25 Compositae Mix - -    26 Benzoyl Peroxide - -    27 Bacitracin - -    28 Formaldehyde - -    29 Methylchloroisothiazolinone / Methylisothiazolinone - -    30 Corticosteroid Mix - -    31 Sodium Lauryl Sulfate - -    32 Lanolin Alcohol - -    33 Turpentine - -    34 Cetylstearylalcohol - -    35 Chlorhexidine Dicluconate - -    36 Budenoside - -    37 Imidazolidinyl Urea  - -    38 Ethyl-2 Cyanoacrylate - -    39 Quaternium 15 (Dowicil 200) - -    40 Decyl Glucoside - -      PLASTICS     No Substance 2 days 4 days remarks     Acrylates - -    41 1 2-Hydroxyethyl Methacrylate (HEMA) - -    42 2 1,4-Butandioldimethacrylate (BUDMA) - -    43 3  2-Ethylhexyl Acrylate - -    44 4 Bisphenol-A-Dimethacrylate  - -    45 5 Diurethane-Dimethacrylate NA NA    46 6 Ethyleneglycoldimethacrylate (EGDMA) - -    47 7 Pentaerythritoltriacrylate (WON) - -    48 8 Triethylene Glycol Dimethacrylate (TEGDMA) - -      Synthetic material/additives       49 9 V-Mhwu-Vzuugjhtzgm - -    50 10 Tricresyl Phosphate - -    51 11 4-Eofz-Ruooulbaznbkr - -    52 12 Bis (2-Ethylhexyl) Phthalate - -    53 13 Dibutylphthalate - -    54 14 Dimethylphthalate - -    55 15 Toluene-2,4-Diisocyanate NA NA    56 16 Diphenylmethane-4,4''-Diisocyanate - -      EPOXY RESIN SYSTEMS        Reactive Solvents - -    57 17 Cresyl Glycidyl Ether - -    58 18 Butyl Glycidyl Ether - -    59 19 Phenyl Glycidyl Ether - -    60 20 1,4-Butanediol  Diglycidyl Ether - -    61 21 1,6-Hexanediole Diglycidyl Ether - -      Hardener / Accelerator - -    62 22 Triethylenetetramine - -    63 23 Diethylenetriamine - -    64 24 Isophorone Diamine (IPD) - -    65 25 N,N-Dimethyl-P-Toluidine - -      EMULSIFIERS & ADDITIVES   No Substance 2 days 4 days remarks   66 1 Polyethylene Glycol-400 - -    67 2 Cocamidopropyl Betaine - -    68 3 Amerchol L101 - -    69 4 Propylene Glycol - -    70 5 Triethanolamine - -    71 6 Sorbitane Sesquiolate - -    72 7 Isopropylmyristate - -    73 8 Polysorbate 80  - -    74 9 Amidoamine   (Stearamidopropyl Dimethylamine) - -    75 10 Oleamidopropyl Dimethylamine - -    76 11 Lauryl Glucoside - -    77 12 Coconut Diethanolamide  - -    78 13 2-Hydroxy-4-Methoxy Benzophenone (Oxybenzone) - -    79 14 Benzophenone-4 (Sulisobenzon) - -    80 15 Propolis - -    81 16 Dexpanthenol - -    82 17 Carboxymethyl Cellulose Sodium NA NA    83 18 Abitol - -    84 19 Tert-Butylhydroquinone - -    85 20 Benzyl Salicylate - -      Antioxidant      86 21 Dodecyl Gallate - -    87 22 Butylhydroxyanisole (BHA) - -    88 23 Butylhydroxytoluene (BHT) - -    89 24 Di-Alpha-Tocopherol (Vit E) - -    90 25 Propyl Gallate - -    91 26 Zinc Pyrithione NA NA    92 27 Dimethylaminopropylamin (DMAPA)        PRESERVATIVES & ANTIMICROBIALS     No Substance 2 days 4 days remarks   93 1  1,2-Benzisothiazoline-3-One, Sodium Salt - -    94 2  1,3,5-Renard (2-Hydroxyethyl) - Hexahydrotriazine (Grotan BK) - -    95 3 7-Wfybfcsucdnqk-9-Nitro-1, 3-Propanediol - -    96 4  3, 4, 4' - Triclocarban - -    97 5 4 - Chloro - 3 - Cresol - -    98 6 4 - Chloro - 4 - Xylenol (PCMX) - -    99 7 7-Ethylbicyclooxazolidine (Bioban [id number]) - -    100 8 Benzalkonium Chloride NA NA    101 9 Benzyl Alcohol - -    102 10 Cetalkonium Chloride - -    103 11 Cetylpyrimidine Chloride  - -    104 12 Chloroacetamide - -    105 13 DMDM Hydantoin - -    106 14 Glutaraldehyde - -    107 15 Triclosan - -     108 16 Glyoxal Trimeric Dihydrate - -    109 17 Iodopropynyl Butylcarbamate - -    110 18 Octylisothiazoline - -    111 19 Iodoform NA NA    112 20 (Nitrobutyl) Morpholine/(Ethylnitro-Trimethylene) Dimorpholine (Bioban P 1487) - -    113 21 Phenoxyethanol - -    114 22 Phenyl Salicylate - -    115 23 Povidone Iodine - -    116 24 Sodium Benzoate - -    117 25 Sodium Disulfite - -    118 26 Sorbic Acid - -    129 27 Thimerosal - -    120 28 Melamine Formeladyde Resin      121 29 Ethylenediamine Dihydrochloride        Parabens      123 30 Butyl-P-Hydroxybenzoate - -    123 31 Ethyl-P-Hydroxybenzoate - -    124 32 Methyl-P-Hydroxybenzoate - -    125 33 Propyl-P-Hydroxybenzoate - -      PERFUMES, FLAVORS & PLANTS      No Substance 2 days 4 days remarks   126 1 Benzyl Cinnamate - -    127 2 Di-Limonene (Dipentene) - -    128 3 Cananga Odorata (Toni Muñoz) (I) - -    129 4 Lichen Acid Mix - -    130 5 Mentha Piperita Oil (Peppermint Oil) - -    131 6 Sesquiterpenelactone mix - -    132 7 Tea Tree Oil, Oxidized - -    133 8 Wood Tar Mix - +    134 9 Abietic Acid - -    135 10 Lavendula Angustifolia Oil (Lavender Oil) - -    136 11 Camphor  NA NA      Fragrance Mix I      137 12 Oakmoss Absolute - -    138 13 Eugenol - -    139 14 Geraniol - -    140 15 Hydroxycitronellal - -    141 16 Isoeugenol - -    142 17 Cinnamic Aldehyde ++ +/++    143 18 Cinnamic Alcohol  - -      Fragrance mix II      144 19 Citronellol - -    145 20 Alpha-Hexylcinnamic Aldehyde    - -    146 21 Citral - -    147 22 Farnesol - -    148 23 Coumarin - -      Results of patch tests:                         Interpretation:    - Negative                    A    = Allergic      (+) Erythema    TI   = Toxic/irritant   + E + Infiltration    RaP = Relevance at Present     ++ E/I + Papulovesicle   Rpr  = Relevance Previously     +++ E/I/P + Blister     nR   = No Relevance      [x] Allergic reaction diagnosed against following allergens: Balsam of [place]++,  fragrance mix ++, Cinnamic aldehyde ++, Wood tar mix      Interpretation/ remarks:   It could be that the oral problem is linked to a contact allergy to various fragrances (fragrance mix I contains cinnamic aldehyde and this explains and re-inforces the allergy to fragrances in dental products). Could be also the fragrances in dental products during dental procedures. Probably the seasoning to nuts    [x] Patient information given   [x] ACDS CAMP information's (# [id number], and [id number]) to following compounds:  Balsam of [place], fragrance mix, cinnamic aldehyde, wood tar mix   [x] General information's to following compounds: cinnamic aldehyde and balsam of peru    ==> final Diagnosis:    # Atopic predisposition with:    Flexural eczema since childhood    seasonal Rhinitis July to September    RC after contact with cat    Possible oral allergy syndrome to tree nuts (Almonds), less peanuts, not soy    # possible delayed type hypersensitivity to tooth paste or mouth water and oral symptoms    Also reactions to band aid  --> perform patch tests      These conclusions are made at the best of one's knowledge and belief based on the provided evidence such as patient's history and allergy test results and they can change over time or can be incomplete because of missing information's.      Patient counseling:  Use CAMP Fozia and avoid dental and oral products with lots of flavor and fragrances      Follow-up: in Derm-Allergy clinic if necessary    Thank you for the opportunity be involved in the care of this patient.     Staff: Uzma Cameron LPN    I spent a total of 37 minutes face to face with Murtaza Arroyo during today s office visit. Over 50% of this time was spent reading the patch tests, discussing all the test results, correlating them to the clinical relevance, counseling the patient and/or coordinating care. Moreover time was spent to install and explain the CAMP Fozia. Please see Assessment and Plan for details.         Again, thank you for allowing me to participate in the care of your patient.        Sincerely,        Eduardo Reagan MD

## 2020-11-20 NOTE — PATIENT INSTRUCTIONS
Atopy Screen (Placed 11/16/20 )    No Substance Readings (15 min) Evaluation   POS Histamine 1mg/ml ++    NEG NaCl 0.9% -      No Substance Readings (15 min) Evaluation   1 Alternaria alternata (tenuis)  -    2 Cladosporium herbarum -    3 Aspergillus fumigatus -    4 Penicillium notatum -    5 Dermatophagoides pteronyssinus -    6 Dermatophagoides farinae -    7 Dog epithelium (canis spp) -    8 Cat hair (denise catus) -    9 Cockroach   (Blatella americana & germanica) -    10 Grass mix midwest   (Sloane, Orchard, Redtop, Thanh) -    11 Bobby grass (sorghum halepense) -    12 Weed mix   (common Cocklebur, Lamb s quarters, rough redroot Pigweed, Dock/Sorrel) -    13 Mug wort (artemisia vulgare) -    14 Ragweed giant/short (ambrosia spp) -    15 English Plantain (plantago lanceolata) -    16 Tree mix 1 (Pecan, Maple BHR, Oak RVW, american Dunnegan, black [place]) -    17 Red cedar (juniperus virginia) -    18 Tree mix 2   (white Kamari, river/red Birch, black [place], common Parks[place], american Elm) -    19 Box elder/Maple mix (acer spp) -    20 Athens shagbark (carya ovata) -       -        No Substance Readings (15min) Evaluation   1 Menard (prunus dulcis) -    2 Brazil nut (bertholletia excels) -    3 Cashew nut (anacardium occidentale) -    4 Coconut (cocos nucifera) -    5 Hazelnut (corylus americana) -    6 Pecan (carya illinoinensis) -    7 [place]ville (juglans regia) -    8 Pistachio nut (pistacia vera) -    9 Peanut (arachis hypogaea) -    10  Soybean (glycine max)          Conclusion: no signs for specific immediate type hypersensitivity to any food allergen    RESULTS & EVALUATION of PATCH TESTS    Patch test readings after     [x] 2 days, [] 3 days [x] 4 days, [] 5 days,    Applied patch tests with results (import here the list of patch tests):    STANDARD Series      No Substance 2 days 4 days remarks   1 Tab Mix [C] - -    2 Colophony - -    3  2-Mercaptobenzothiazole  - -     4 Methylisothiazolinone - -     5 Carba Mix - -    6 Thiuram Mix [A] + -    7 Bisphenol A Epoxy Resin - -    8 X-Danh-Dqwewvrtrxn-Formaldehyde Resin - -    9 Mercapto Mix [A] - -    10 Black Rubber Mix- PPD [B] - -    11 Potassium Dichromate  -  -    12 Balsam of [place] (Myroxylon Pereirae Resin) +/++ +    13 Nickel Sulphate Hexahydrate - -    14 Mixed Dialkyl Thiourea - -    15 Paraben Mix [B] - -    16 Methyldibromo Glutaronitrile - -    17 Fragrance Mix ++ +/++    18 2-Bromo-2-Nitropropane-1,3-Diol (Bronopol) - -    19 Lyral - -    20 Tixocortol-21- Pivalate - -    21 Diazolidiyl Urea (Germall II) - -    22 Methyl Methacrylate - -    23 Cobalt (II) Chloride Hexahydrate - -    24 Fragrance Mix II  - -    25 Compositae Mix - -    26 Benzoyl Peroxide - -    27 Bacitracin - -    28 Formaldehyde - -    29 Methylchloroisothiazolinone / Methylisothiazolinone - -    30 Corticosteroid Mix - -    31 Sodium Lauryl Sulfate - -    32 Lanolin Alcohol - -    33 Turpentine - -    34 Cetylstearylalcohol - -    35 Chlorhexidine Dicluconate - -    36 Budenoside - -    37 Imidazolidinyl Urea  - -    38 Ethyl-2 Cyanoacrylate - -    39 Quaternium 15 (Dowicil 200) - -    40 Decyl Glucoside - -      PLASTICS     No Substance 2 days 4 days remarks     Acrylates - -    41 1 2-Hydroxyethyl Methacrylate (HEMA) - -    42 2 1,4-Butandioldimethacrylate (BUDMA) - -    43 3  2-Ethylhexyl Acrylate - -    44 4 Bisphenol-A-Dimethacrylate  - -    45 5 Diurethane-Dimethacrylate NA NA    46 6 Ethyleneglycoldimethacrylate (EGDMA) - -    47 7 Pentaerythritoltriacrylate (WON) - -    48 8 Triethylene Glycol Dimethacrylate (TEGDMA) - -      Synthetic material/additives       49 9 F-Hzlg-Avndvvbwuzw - -    50 10 Tricresyl Phosphate - -    51 11 6-Tmaz-Nroedecfyzyec - -    52 12 Bis (2-Ethylhexyl) Phthalate - -    53 13 Dibutylphthalate - -    54 14 Dimethylphthalate - -    55 15 Toluene-2,4-Diisocyanate NA NA    56 16 Diphenylmethane-4,4''-Diisocyanate - -      EPOXY RESIN SYSTEMS         Reactive Solvents - -    57 17 Cresyl Glycidyl Ether - -    58 18 Butyl Glycidyl Ether - -    59 19 Phenyl Glycidyl Ether - -    60 20 1,4-Butanediol Diglycidyl Ether - -    61 21 1,6-Hexanediole Diglycidyl Ether - -      Hardener / Accelerator - -    62 22 Triethylenetetramine - -    63 23 Diethylenetriamine - -    64 24 Isophorone Diamine (IPD) - -    65 25 N,N-Dimethyl-P-Toluidine - -      EMULSIFIERS & ADDITIVES   No Substance 2 days 4 days remarks   66 1 Polyethylene Glycol-400 - -    67 2 Cocamidopropyl Betaine - -    68 3 Amerchol L101 - -    69 4 Propylene Glycol - -    70 5 Triethanolamine - -    71 6 Sorbitane Sesquiolate - -    72 7 Isopropylmyristate - -    73 8 Polysorbate 80  - -    74 9 Amidoamine   (Stearamidopropyl Dimethylamine) - -    75 10 Oleamidopropyl Dimethylamine - -    76 11 Lauryl Glucoside - -    77 12 Coconut Diethanolamide  - -    78 13 2-Hydroxy-4-Methoxy Benzophenone (Oxybenzone) - -    79 14 Benzophenone-4 (Sulisobenzon) - -    80 15 Propolis - -    81 16 Dexpanthenol - -    82 17 Carboxymethyl Cellulose Sodium NA NA    83 18 Abitol - -    84 19 Tert-Butylhydroquinone - -    85 20 Benzyl Salicylate - -      Antioxidant      86 21 Dodecyl Gallate - -    87 22 Butylhydroxyanisole (BHA) - -    88 23 Butylhydroxytoluene (BHT) - -    89 24 Di-Alpha-Tocopherol (Vit E) - -    90 25 Propyl Gallate - -    91 26 Zinc Pyrithione NA NA    92 27 Dimethylaminopropylamin (DMAPA)        PRESERVATIVES & ANTIMICROBIALS     No Substance 2 days 4 days remarks   93 1  1,2-Benzisothiazoline-3-One, Sodium Salt - -    94 2  1,3,5-Renard (2-Hydroxyethyl) - Hexahydrotriazine (Grotan BK) - -    95 3 1-Esxseqsiojwvp-3-Nitro-1, 3-Propanediol - -    96 4  3, 4, 4' - Triclocarban - -    97 5 4 - Chloro - 3 - Cresol - -    98 6 4 - Chloro - 4 - Xylenol (PCMX) - -    99 7 7-Ethylbicyclooxazolidine (Luzan [id number]) - -    100 8 Benzalkonium Chloride NA NA    101 9 Benzyl Alcohol - -    102 10 Cetalkonium Chloride - -     103 11 Cetylpyrimidine Chloride  - -    104 12 Chloroacetamide - -    105 13 DMDM Hydantoin - -    106 14 Glutaraldehyde - -    107 15 Triclosan - -    108 16 Glyoxal Trimeric Dihydrate - -    109 17 Iodopropynyl Butylcarbamate - -    110 18 Octylisothiazoline - -    111 19 Iodoform NA NA    112 20 (Nitrobutyl) Morpholine/(Ethylnitro-Trimethylene) Dimorpholine (Bioban P 1487) - -    113 21 Phenoxyethanol - -    114 22 Phenyl Salicylate - -    115 23 Povidone Iodine - -    116 24 Sodium Benzoate - -    117 25 Sodium Disulfite - -    118 26 Sorbic Acid - -    129 27 Thimerosal - -    120 28 Melamine Formeladyde Resin      121 29 Ethylenediamine Dihydrochloride        Parabens      123 30 Butyl-P-Hydroxybenzoate - -    123 31 Ethyl-P-Hydroxybenzoate - -    124 32 Methyl-P-Hydroxybenzoate - -    125 33 Propyl-P-Hydroxybenzoate - -      PERFUMES, FLAVORS & PLANTS      No Substance 2 days 4 days remarks   126 1 Benzyl Cinnamate - -    127 2 Di-Limonene (Dipentene) - -    128 3 Cananga Odorata (Toni Muñoz) (I) - -    129 4 Lichen Acid Mix - -    130 5 Mentha Piperita Oil (Peppermint Oil) - -    131 6 Sesquiterpenelactone mix - -    132 7 Tea Tree Oil, Oxidized - -    133 8 Wood Tar Mix - +    134 9 Abietic Acid - -    135 10 Lavendula Angustifolia Oil (Lavender Oil) - -    136 11 Camphor  NA NA      Fragrance Mix I      137 12 Oakmoss Absolute - -    138 13 Eugenol - -    139 14 Geraniol - -    140 15 Hydroxycitronellal - -    141 16 Isoeugenol - -    142 17 Cinnamic Aldehyde ++ +/++    143 18 Cinnamic Alcohol  - -      Fragrance mix II      144 19 Citronellol - -    145 20 Alpha-Hexylcinnamic Aldehyde    - -    146 21 Citral - -    147 22 Farnesol - -    148 23 Coumarin - -      Results of patch tests:                         Interpretation:    - Negative                    A    = Allergic      (+) Erythema    TI   = Toxic/irritant   + E + Infiltration    RaP = Relevance at Present     ++ E/I + Papulovesicle   Rpr  =  Relevance Previously     +++ E/I/P + Blister     nR   = No Relevance      [x] Allergic reaction diagnosed against following allergens: Balsam of Peru++, fragrance mix ++, Cinnamic aldehyde ++, Wood tar mix      Interpretation/ remarks:   It could be that the oral problem is linked to a contact allergy to various fragrances (fragrance mix I contains cinnamic aldehyde and this explains and re-inforces the allergy to fragrances in dental products). Could be also the fragrances in dental products during dental procedures. Probably the seasoning to nuts    [x] Patient information given   [x] ACDS CAMP information's (# [id number], and [id number]) to following compounds:  Balsam of Peru, fragrance mix, cinnamic aldehyde, wood tar mix   [x] General information's to following compounds: cinnamic aldehyde and balsam of peru    ==> final Diagnosis:    # Atopic predisposition with:    Flexural eczema since childhood    seasonal Rhinitis July to September    RC after contact with cat    Possible oral allergy syndrome to tree nuts (Almonds), less peanuts, not soy    # possible delayed type hypersensitivity to tooth paste or mouth water and oral symptoms    Also reactions to band aid  --> perform patch tests      These conclusions are made at the best of one's knowledge and belief based on the provided evidence such as patient's history and allergy test results and they can change over time or can be incomplete because of missing information's.      Patient counseling:  Use CAMP Fozia and avoid dental and oral products with lots of flavor and fragrances      Follow-up: in Derm-Allergy clinic if necessary

## 2020-12-27 ENCOUNTER — HEALTH MAINTENANCE LETTER (OUTPATIENT)
Age: 30
End: 2020-12-27

## 2021-05-22 ENCOUNTER — NURSE TRIAGE (OUTPATIENT)
Dept: NURSING | Facility: CLINIC | Age: 31
End: 2021-05-22

## 2021-05-22 NOTE — TELEPHONE ENCOUNTER
Patient reporting a nail scratched her finger. Reports the nail was not dirty or caty but had concerns regarding her last tetanus shot. Denies pain, discharge or excessive bleeding. Last tetanus received September 2017. Home care advice provided per protocol. Call back instructions provided. Patient agreeable to plan.     Reason for Disposition    ALSO, superficial cut (scratch) or abrasion (scrape) is present    Additional Information    Negative: Wound looks infected    Negative: Caused by animal bite    Negative: Caused by human bite    Negative: Amputated finger    Negative: Skin is split open or gaping (or length > 1/2 inch or 12 mm)    Negative: [1] Bleeding AND [2] won't stop after 10 minutes of direct pressure (using correct technique)    Negative: [1] Dirt in the wound AND [2] not removed with 15 minutes of scrubbing    Negative: High pressure injection injury (e.g., from grease gun or paint gun, usually work-related)    Negative: Looks like a broken bone (e.g., crooked or deformed)    Negative: Looks like a dislocated joint (e.g., crooked or deformed)    Negative: [1] Fingernail is partially torn AND [2] from crush injury  (Exception: torn nail from catching it on something)    Negative: [1] Cut AND [2] numbness (loss of sensation) of finger    Negative: [1] Cut AND [2] finger joint can't be opened (straightened) or closed (bent) completely    Negative: Sounds like a serious injury to the triager    Negative: [1] SEVERE pain AND [2] not improved 2 hours after pain medicine/ice packs    Negative: [1] MODERATE-SEVERE pain AND [2] blood present under a nail    Negative: Fingernail is completely torn off (fingernail avulsion)    Negative: Base of fingernail has popped out of the skin fold (cuticle)    Negative: Suspicious history for the injury    Negative: Large swelling or bruise    Negative: Finger joint can't be opened (straightened) or closed (bent) completely    (Note: injured person should be able to  do this without assistance)    Negative: [1] No prior tetanus shots (or is not fully vaccinated) AND [2] any wound (e.g., cut, scrape)    Negative: [1] HIV positive or severe immunodeficiency (severely weak immune system) AND [2] DIRTY cut or scrape    Negative: [1] Last tetanus shot > 5 years ago AND [2] DIRTY cut or scrape    Negative: [1] Last tetanus shot > 10 years ago AND [2] CLEAN cut or scrape (e.g., object AND skin were clean)    Negative: [1] After 3 days AND [2] pain not improved    Negative: [1] After 1 week AND [2] not using the finger normally    Negative: [1] Fingernail comes off or is almost off AND [2] follows old injury AND [3] wants doctor to remove it    Protocols used: FINGER INJURY-A-AH  COVID 19 Nurse Triage Plan/Patient Instructions    Please be aware that novel coronavirus (COVID-19) may be circulating in the community. If you develop symptoms such as fever, cough, or SOB or if you have concerns about the presence of another infection including coronavirus (COVID-19), please contact your health care provider or visit [email or web address].     Disposition/Instructions    Home care recommended. Follow home care protocol based instructions.    Thank you for taking steps to prevent the spread of this virus.  o Limit your contact with others.  o Wear a simple mask to cover your cough.  o Wash your hands well and often.    Resources    [facility]: About COVID-19: [email or web address]    CDC: What to Do If You're Sick: www.cdc.gov/coronavirus/2019-ncov/about/steps-when-sick.html    CDC: Ending Home Isolation: www.cdc.gov/coronavirus/2019-ncov/hcp/disposition-in-home-patients.html     CDC: Caring for Someone: www.cdc.gov/coronavirus/2019-ncov/if-you-are-sick/care-for-someone.html     [facility]: Interim Guidance for Hospital Discharge to Home: www.health.[facility].mn.us/diseases/coronavirus/hcp/hospdischarge.pdf    [facility] clinical trials (COVID-19 research studies):  clinicalaffairs.[facility]-clinical-trials     Below are the COVID-19 hotlines at the [place] Department of [facility] ([facility]). Interpreters are available.   o For health questions: Call 264-158-9756 or 1-696.222.5983 (7 a.m. to 7 p.m.)  o For questions about schools and childcare: Call 793-146-2245 or 1-980.129.4677 (7 a.m. to 7 p.m.)

## 2021-06-30 ENCOUNTER — TRANSFERRED RECORDS (OUTPATIENT)
Dept: HEALTH INFORMATION MANAGEMENT | Facility: CLINIC | Age: 31
End: 2021-06-30

## 2021-08-05 ENCOUNTER — MEDICAL CORRESPONDENCE (OUTPATIENT)
Dept: HEALTH INFORMATION MANAGEMENT | Facility: CLINIC | Age: 31
End: 2021-08-05

## 2021-08-11 ENCOUNTER — TELEPHONE (OUTPATIENT)
Dept: OBGYN | Facility: CLINIC | Age: 31
End: 2021-08-11

## 2021-08-11 NOTE — TELEPHONE ENCOUNTER
Spoke to patient, scheduled appointment for 9/2. Patient  Aware of address.    Sonia Wong  Clinical Services Assistant

## 2021-08-11 NOTE — TELEPHONE ENCOUNTER
Received referral from [facility] - 29yo P0 with recurrent vulvar abscess, left labia minora. Unclear if Bartholin's, now draining from site of lateral I&D or other. Looking for definitive treatment or longer term plan.     Routing to scheduling. Records scanned.

## 2021-08-24 ASSESSMENT — ANXIETY QUESTIONNAIRES
1. FEELING NERVOUS, ANXIOUS, OR ON EDGE: NOT AT ALL
2. NOT BEING ABLE TO STOP OR CONTROL WORRYING: SEVERAL DAYS
3. WORRYING TOO MUCH ABOUT DIFFERENT THINGS: SEVERAL DAYS
5. BEING SO RESTLESS THAT IT IS HARD TO SIT STILL: NOT AT ALL
4. TROUBLE RELAXING: NOT AT ALL
GAD7 TOTAL SCORE: 2
7. FEELING AFRAID AS IF SOMETHING AWFUL MIGHT HAPPEN: NOT AT ALL
6. BECOMING EASILY ANNOYED OR IRRITABLE: NOT AT ALL

## 2021-08-24 NOTE — TELEPHONE ENCOUNTER
Pt had an appt on 9/2, she has a lot of pain where her abscess/cyst are, and wondering if there's something sooner, I added to the wait list also, please call Murtaza, thank you

## 2021-09-01 ASSESSMENT — ANXIETY QUESTIONNAIRES: GAD7 TOTAL SCORE: 2

## 2021-09-10 ASSESSMENT — ANXIETY QUESTIONNAIRES
8. IF YOU CHECKED OFF ANY PROBLEMS, HOW DIFFICULT HAVE THESE MADE IT FOR YOU TO DO YOUR WORK, TAKE CARE OF THINGS AT HOME, OR GET ALONG WITH OTHER PEOPLE?: NOT DIFFICULT AT ALL
5. BEING SO RESTLESS THAT IT IS HARD TO SIT STILL: NOT AT ALL
3. WORRYING TOO MUCH ABOUT DIFFERENT THINGS: SEVERAL DAYS
4. TROUBLE RELAXING: NOT AT ALL
6. BECOMING EASILY ANNOYED OR IRRITABLE: NOT AT ALL
GAD7 TOTAL SCORE: 2
GAD7 TOTAL SCORE: 2
2. NOT BEING ABLE TO STOP OR CONTROL WORRYING: SEVERAL DAYS
1. FEELING NERVOUS, ANXIOUS, OR ON EDGE: NOT AT ALL
7. FEELING AFRAID AS IF SOMETHING AWFUL MIGHT HAPPEN: NOT AT ALL
GAD7 TOTAL SCORE: 2
7. FEELING AFRAID AS IF SOMETHING AWFUL MIGHT HAPPEN: NOT AT ALL

## 2021-09-10 ASSESSMENT — ENCOUNTER SYMPTOMS
DECREASED LIBIDO: 1
HOT FLASHES: 0

## 2021-09-14 NOTE — PROGRESS NOTES
"[facility]  Gynecology Consult Visit    SUBJECTIVE    Murtaza Arroyo is a 30 year old G0 who is here for recurrent vulvar abscesses. She has been seen at [facility] for treatment of 3-4 abscesses over the last year. She has received at least 1 course of antibiotics and has had a Word catheter placement once also. Culture of one abscess event was sterile, though that event was associated with cellulitis of the skin. Typically the abscess is triggered by having sex. The abscess will grow over 5-7 days and then rupture. It is very painful. It is not associated with fever or rash. No dysuria or dyschezia.     Additionally, Murtaza notes that she has some pain upon entry with penetrative sex. She feels she has difficulty relaxing. She rarely feels orgasm.     Her LMP is: Patient's last menstrual period was 09/17/2021.    PAST MEDICAL HISTORY  No past medical history on file.    MEDICATIONS  Current Outpatient Medications   Medication     Cyanocobalamin (VITAMIN B 12) 100 MCG LOZG     Omega-3 Fatty Acids (FISH OIL) 500 MG CAPS     No current facility-administered medications for this visit.       ALLERGIES  No Known Allergies    OBSTETRIC/GYNECOLOGIC HISTORY  G0  No contraceptives listed    PAST SURGICAL HISTORY   Past Surgical History:   Procedure Laterality Date     BIOPSY         SOCIAL HISTORY    Social History     Tobacco Use     Smoking status: Never Smoker     Smokeless tobacco: Never Used   Substance Use Topics     Alcohol use: Never     Drug use: Never       FAMILY HISTORY    Family History   Problem Relation Age of Onset     Other Cancer Paternal Grandmother      Depression Father        REVIEW OF SYSTEMS  A 10 point review of systems including Constitutional, Eyes, Respiratory, Cardiovascular, Gastroenterology, Genitourinary, Integumentary, Musculoskeletal, and Psychiatric, were all negative, except for pertinent positives noted in the above HPI.    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 Exam today normal, but Murtaza is without symptoms.     PLAN  Given that Murtaza has failed Word catheterization and has had 4 recurrences this year, recommend marsupialization with next recurrence in the OR. Murtaza will call with symptoms to schedule urgently.     I recommended diabetes and HIV screening given the number of recurrences. Murtaza is in agreement. She will get these done at [facility] and will fax the records to [facility].    Diane Tuttle MD, MSCI    [facility]/OB[facility]N

## 2021-09-17 ENCOUNTER — OFFICE VISIT (OUTPATIENT)
Dept: OBGYN | Facility: CLINIC | Age: 31
End: 2021-09-17
Attending: OBSTETRICS & GYNECOLOGY
Payer: COMMERCIAL

## 2021-09-17 VITALS
BODY MASS INDEX: 20.47 KG/M2 | DIASTOLIC BLOOD PRESSURE: 75 MMHG | SYSTOLIC BLOOD PRESSURE: 117 MMHG | HEART RATE: 73 BPM | HEIGHT: 61 IN | WEIGHT: 108.4 LBS

## 2021-09-17 DIAGNOSIS — N75.1 ABSCESS OF BARTHOLIN'S GLAND: Primary | ICD-10-CM

## 2021-09-17 PROCEDURE — G0463 HOSPITAL OUTPT CLINIC VISIT: HCPCS

## 2021-09-17 PROCEDURE — 99203 OFFICE O/P NEW LOW 30 MIN: CPT | Performed by: OBSTETRICS & GYNECOLOGY

## 2021-09-17 ASSESSMENT — MIFFLIN-ST. JEOR: SCORE: 1144.08

## 2021-09-17 NOTE — LETTER
9/17/2021       RE: Murtaza Arroyo  [address]  [place] [address]     Dear Colleague,    Thank you for referring your patient, Murtaza Arroyo, to the [facility] [place] at [facility]. Please see a copy of my visit note below.    [facility]  Gynecology Consult Visit    SUBJECTIVE    Murtaza Arroyo is a 30 year old G0 who is here for recurrent vulvar abscesses. She has been seen at [facility] for treatment of 3-4 abscesses over the last year. She has received at least 1 course of antibiotics and has had a Word catheter placement once also. Culture of one abscess event was sterile, though that event was associated with cellulitis of the skin. Typically the abscess is triggered by having sex. The abscess will grow over 5-7 days and then rupture. It is very painful. It is not associated with fever or rash. No dysuria or dyschezia.     Additionally, Murtaza notes that she has some pain upon entry with penetrative sex. She feels she has difficulty relaxing. She rarely feels orgasm.     Her LMP is: Patient's last menstrual period was 09/17/2021.    PAST MEDICAL HISTORY  No past medical history on file.    MEDICATIONS  Current Outpatient Medications   Medication     Cyanocobalamin (VITAMIN B 12) 100 MCG LOZG     Omega-3 Fatty Acids (FISH OIL) 500 MG CAPS     No current facility-administered medications for this visit.       ALLERGIES  No Known Allergies    OBSTETRIC/GYNECOLOGIC HISTORY  G0  No contraceptives listed    PAST SURGICAL HISTORY   Past Surgical History:   Procedure Laterality Date     BIOPSY         SOCIAL HISTORY    Social History     Tobacco Use     Smoking status: Never Smoker     Smokeless tobacco: Never Used   Substance Use Topics     Alcohol use: Never     Drug use: Never       FAMILY HISTORY    Family History   Problem Relation Age of Onset     Other Cancer Paternal Grandmother      Depression Father        REVIEW OF SYSTEMS  A  "10 point review of systems including Constitutional, Eyes, Respiratory, Cardiovascular, Gastroenterology, Genitourinary, Integumentary, Musculoskeletal, and Psychiatric, were all negative, except for pertinent positives noted in the above HPI.    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 Exam today normal, but Murtaza is without symptoms.     PLAN  Given that Murtaza has failed Word catheterization and has had 4 recurrences this year, recommend marsupialization with next recurrence in the OR. Murtaza will call with symptoms to schedule urgently.     I recommended diabetes and HIV screening given the number of recurrences. Murtaza is in agreement. She will get these done at [facility] and will fax the records to [facility].    Diane Tuttle MD, MSCI    [facility]OB[facility]    "

## 2021-09-22 ENCOUNTER — TRANSFERRED RECORDS (OUTPATIENT)
Dept: HEALTH INFORMATION MANAGEMENT | Facility: CLINIC | Age: 31
End: 2021-09-22
Payer: COMMERCIAL

## 2021-10-05 ENCOUNTER — TELEPHONE (OUTPATIENT)
Dept: OBGYN | Facility: CLINIC | Age: 31
End: 2021-10-05

## 2021-10-05 DIAGNOSIS — Z11.59 ENCOUNTER FOR SCREENING FOR OTHER VIRAL DISEASES: ICD-10-CM

## 2021-10-05 DIAGNOSIS — N75.0 BARTHOLIN GLAND CYST: Primary | ICD-10-CM

## 2021-10-05 RX ORDER — ACETAMINOPHEN 325 MG/1
975 TABLET ORAL ONCE
Status: CANCELLED | OUTPATIENT
Start: 2021-10-05 | End: 2021-10-05

## 2021-10-05 NOTE — TELEPHONE ENCOUNTER
[facility] Call [facility]    Phone Message    May a detailed message be left on voicemail: yes     Reason for Call: Other: Pt called in stating her abscess is back and growing quickly, would like to be seen by Dr. Parag REYNOLDS but she is currently booked out until December. Please call pt back to discuss options     Action Taken: Message routed to:  Other: whs    Travel Screening: Not Applicable

## 2021-10-05 NOTE — TELEPHONE ENCOUNTER
She may have a scar. Recovery will be similar to prior recoveries when she had a word catheter - small amount of pain that is manageable with over the counter medications, light activity for a week, no sex until the incision is healed. The procedure is easiest when the cyst is full.    Dr. Tuttle

## 2021-10-05 NOTE — TELEPHONE ENCOUNTER
"Called to patient to discuss recommended procedure. Patient agrees to proceed. She has the following questions:    - What are the external cosmetic consequences of the procedure?  - What will recovery look like?  - Does this procedure need to be done when abscess is present? Currently she says abscess is \"full\" but expects this will likely \"burst\" but tomorrow.     Patient would also like approximate cost. Email sent to [employer] for estimate.     Routing to Dr. Tuttle for review of questions.   "

## 2021-10-05 NOTE — TELEPHONE ENCOUNTER
Broderick Lopez,    I'd like for Murtaza to have a marsupialization in the OR. I have placed orders. She doesn't need a physical, can do H&P in pre-op day of.    JR

## 2021-10-06 ENCOUNTER — TELEPHONE (OUTPATIENT)
Dept: OBGYN | Facility: CLINIC | Age: 31
End: 2021-10-06

## 2021-10-06 ENCOUNTER — NURSE TRIAGE (OUTPATIENT)
Dept: NURSING | Facility: CLINIC | Age: 31
End: 2021-10-06

## 2021-10-06 ENCOUNTER — LAB (OUTPATIENT)
Dept: URGENT CARE | Facility: URGENT CARE | Age: 31
End: 2021-10-06
Attending: OBSTETRICS & GYNECOLOGY
Payer: COMMERCIAL

## 2021-10-06 DIAGNOSIS — Z11.59 ENCOUNTER FOR SCREENING FOR OTHER VIRAL DISEASES: ICD-10-CM

## 2021-10-06 PROCEDURE — U0005 INFEC AGEN DETEC AMPLI PROBE: HCPCS

## 2021-10-06 PROCEDURE — U0003 INFECTIOUS AGENT DETECTION BY NUCLEIC ACID (DNA OR RNA); SEVERE ACUTE RESPIRATORY SYNDROME CORONAVIRUS 2 (SARS-COV-2) (CORONAVIRUS DISEASE [COVID-19]), AMPLIFIED PROBE TECHNIQUE, MAKING USE OF HIGH THROUGHPUT TECHNOLOGIES AS DESCRIBED BY CMS-2020-01-R: HCPCS

## 2021-10-06 NOTE — TELEPHONE ENCOUNTER
Confirmed surgery date,time and location, 10/8/21, arrival time at 11:30a.m with nothing to eat eight hours before scheduled surgery time, clear liquids up to two hours before, h&p will be done day of procedure, COVID testing ordered.     to complete the following fields:            CHECKLIST     Google Calendar : Yes     Resident notified: Not Applicable     Clinic schedule blocked: Not Applicable    Patient notified:Yes      Pre op information sent: Yes     Given to patient over the phone.Yes    Comments:

## 2021-10-07 ENCOUNTER — TELEPHONE (OUTPATIENT)
Dept: OBGYN | Facility: CLINIC | Age: 31
End: 2021-10-07

## 2021-10-07 LAB — SARS-COV-2 RNA RESP QL NAA+PROBE: NEGATIVE

## 2021-10-07 NOTE — TELEPHONE ENCOUNTER
"Returned answering service call for \"seen for abscess\".      Feel like sx is getting worse, I can feel leg to abdomen is warm on left side, inguinal area swollen and can feel pain especially at left vulva.  Pain causing goose bumps.  Doesn't know if she can wait until Friday, wondering about surgery tomorrow.  Worried current cyst will burst. Ibuprofen 3 tabs PO 4 hours ago, maybe helped a little, can't tell.  Denies f/c/n/v/d.  Encouraged ED if sx not bearable at home, will send message about (explained remote) possibility of surgery 10/7/21.    Yamilex Reyes MD, MPH     "

## 2021-10-07 NOTE — TELEPHONE ENCOUNTER
----- Message from Hazel Jung RN sent at 10/7/2021  9:46 AM CDT -----  Regarding: ruptured cyst - has questions  Cyst Ruptured, Discharge    Surgery tomorrow   Do I still need procedure?  [phone]

## 2021-10-07 NOTE — TELEPHONE ENCOUNTER
Shan calls and says that she has a recurrence of a Bartholin cyst on the left side of her vagina. Pt. Says that the cyst is on the outside of the left side of her vagina and is painful. Pt. Says that she has the chills, too.  Pt. Also says that her left groin is swollen. Groin swelling began tonight. Pt. Says that she is scheduled to have surgery on her Bartholin Cyst on Friday but wants to see if she can have the surgery scheduled for tomorrow. RN then called [facility]  who says that if pt. Wants to change her surgery date, pt. Needs to call her clinic in the am tomorrow when the clinic opens.  says that pt. Can call: [phone]-her clinic #. RN then told pt. This information and pt. Voiced understanding. Pt. Refused triage for her symptom, too. COVID 19 Nurse Triage Plan/Patient Instructions    Please be aware that novel coronavirus (COVID-19) may be circulating in the community. If you develop symptoms such as fever, cough, or SOB or if you have concerns about the presence of another infection including coronavirus (COVID-19), please contact your health care provider or visit [email or web address].     Disposition/Instructions    Virtual Visit with provider recommended. Reference Visit Selection Guide.    Thank you for taking steps to prevent the spread of this virus.  o Limit your contact with others.  o Wear a simple mask to cover your cough.  o Wash your hands well and often.    Resources    [facility]: About COVID-19: [email or web address]    CDC: What to Do If You're Sick: www.cdc.gov/coronavirus/2019-ncov/about/steps-when-sick.html    CDC: Ending Home Isolation: www.cdc.gov/coronavirus/2019-ncov/hcp/disposition-in-home-patients.html     CDC: Caring for Someone: www.cdc.gov/coronavirus/2019-ncov/if-you-are-sick/care-for-someone.html     [facility]: Interim Guidance for Hospital Discharge to Home: www.health.[facility].mn.us/diseases/coronavirus/hcp/hospdischarge.pdf    [facility]  [place] clinical trials (COVID-19 research studies): [email or web address]-[email or web address]-trials     Below are the COVID-19 hotlines at the [facility] ([facility]). Interpreters are available.   o For health questions: Call 832-114-4827 or 1-182.974.5929 (7 a.m. to 7 p.m.)  o For questions about schools and childcare: Call 620-798-9405 or 1-451.773.2680 (7 a.m. to 7 p.m.)                    Reason for Disposition    [1] Caller requesting NON-URGENT health information AND [2] PCP's office is the best resource    Additional Information    Negative: [1] Caller is not with the adult (patient) AND [2] reporting urgent symptoms    Negative: Lab result questions    Negative: Medication questions    Negative: Caller can't be reached by phone    Negative: Caller has already spoken to PCP or another triager    Negative: RN needs further essential information from caller in order to complete triage    Negative: Requesting regular office appointment    Protocols used: INFORMATION ONLY CALL - NO TRIAGE-A-

## 2021-10-07 NOTE — TELEPHONE ENCOUNTER
Patient called RN line - states she thinks that cyst ruptured, she is having some discharge. Denies any pain, fever/chills.     Spoke to , plan to evaluate patient tomorrow morning to see if patient will still need surgery. Patient agreeable to plan - scheduled for 930 am per MD.

## 2021-10-07 NOTE — OP NOTE
[facility]  Gynecology Operative Note    PATIENT: Murtaza Arroyo  MRN: [id number]  : 1990    DATE OF OPERATION: 10/8/2021   PREOPERATIVE DIAGNOSES: Left Labial absces  POSTOPERATIVE DIAGNOSES: Same, s/p procedure  PROCEDURE : EUA, Incision and Drainage of left labial cyst with Marsupialization  SURGEON: Diane Tuttle MD   ASSISTANT:  Mike Maldonado DO, MS PGY-1  ANESTHESIA: MAC, local  EBL: 5 mL   IVF: 400 mL crystalloid   COMPLICATIONS: None apparent   SPECIMENS: Vulvar abscess culture  FINDINGS: EUA revealed anteverted uterus, nulliparous size, mobile with no adnexal masses. External genital exam notable for normal appearing external genitalia. Left labia with 3cm area of erythema and induration but no palpable cyst. No area of drainage identified.    INDICATIONS: Murtaza Arroyo is a 30 year old G0 who presents today for left labial abscess drainage and marsupialization. Patient has a history of recurrent vulvar abscesses s/p I&D procedures, word catheter placements, and course of antibiotics. Given failure of conservative management and ongoing recurrences, discussion was had with patient regarding management and patient elected to proceed with procedure to day. Risks, benefits, and alternatives to the procedure were discussed. The patient's questions were answered, understanding confirmed, and the patient signed written informed consent.    DESCRIPTION OF PROCEDURE:  The patient was taken to the operating room and MAC was administered.  She was then placed in the dorsal lithotomy position with feet in yellow fin stirrups.  An exam under anesthesia was performed with the above findings.  The patient was prepped and draped in the usual sterile fashion.The left labial abscess was identified and noted. The cyst was then incised. Cultures were obtained.  There were no loculations.   A vertical incision was then sutured with interrupted 3-0 Vicryl suture at strategic  intervals, approximately 0.5cm apart to achieve marsupialization. Good hemastasis was achieved. All instruments were removed. Gauze was removed from the vagina. Instrument counts were correct x2.  Deep sedation was discontinued in the OR and the patient was transferred to the PACU in stable condition.  Dr. Tuttle was scrubbed and present for entire procedure.   Mike Maldonado DO, MS  Obstetrics, Gynecology & Women's Health   Resident, PGY-1  10/08/2021 2:19 PM     OBGYN Attending Addendum     I, Diane Tuttle, was scrubbed and present for the entire procedure. I have reviewed Dr. Santos's operative report and edited where necessary. I agree with the documentation of findings.     Diane Tuttle MD, MSCI  Date of Service: 10/08/21

## 2021-10-08 ENCOUNTER — HOSPITAL ENCOUNTER (OUTPATIENT)
Facility: CLINIC | Age: 31
Discharge: HOME | End: 2021-10-08
Attending: OBSTETRICS & GYNECOLOGY | Admitting: OBSTETRICS & GYNECOLOGY
Payer: COMMERCIAL

## 2021-10-08 ENCOUNTER — ANESTHESIA (OUTPATIENT)
Dept: SURGERY | Facility: CLINIC | Age: 31
End: 2021-10-08
Payer: COMMERCIAL

## 2021-10-08 ENCOUNTER — ANESTHESIA EVENT (OUTPATIENT)
Dept: SURGERY | Facility: CLINIC | Age: 31
End: 2021-10-08
Payer: COMMERCIAL

## 2021-10-08 ENCOUNTER — OFFICE VISIT (OUTPATIENT)
Dept: OBGYN | Facility: CLINIC | Age: 31
End: 2021-10-08
Payer: COMMERCIAL

## 2021-10-08 VITALS
RESPIRATION RATE: 14 BRPM | HEART RATE: 64 BPM | DIASTOLIC BLOOD PRESSURE: 57 MMHG | HEIGHT: 62 IN | OXYGEN SATURATION: 99 % | WEIGHT: 105.82 LBS | BODY MASS INDEX: 19.47 KG/M2 | TEMPERATURE: 98.2 F | SYSTOLIC BLOOD PRESSURE: 96 MMHG

## 2021-10-08 VITALS
BODY MASS INDEX: 20.48 KG/M2 | HEIGHT: 61 IN | DIASTOLIC BLOOD PRESSURE: 79 MMHG | HEART RATE: 78 BPM | SYSTOLIC BLOOD PRESSURE: 115 MMHG

## 2021-10-08 DIAGNOSIS — N76.4 VULVAR ABSCESS: Primary | ICD-10-CM

## 2021-10-08 DIAGNOSIS — N75.0 BARTHOLIN GLAND CYST: Primary | ICD-10-CM

## 2021-10-08 LAB
GLUCOSE BLDC GLUCOMTR-MCNC: 84 MG/DL (ref 70–99)
HCG UR QL: NEGATIVE
HGB BLD-MCNC: 12.9 G/DL (ref 11.7–15.7)
HOLD SPECIMEN: NORMAL

## 2021-10-08 PROCEDURE — 710N000012 HC RECOVERY PHASE 2, PER MINUTE: Performed by: OBSTETRICS & GYNECOLOGY

## 2021-10-08 PROCEDURE — 258N000003 HC RX IP 258 OP 636: Performed by: REGISTERED NURSE

## 2021-10-08 PROCEDURE — 250N000011 HC RX IP 250 OP 636: Performed by: REGISTERED NURSE

## 2021-10-08 PROCEDURE — 250N000013 HC RX MED GY IP 250 OP 250 PS 637: Performed by: OBSTETRICS & GYNECOLOGY

## 2021-10-08 PROCEDURE — 85018 HEMOGLOBIN: CPT | Performed by: OBSTETRICS & GYNECOLOGY

## 2021-10-08 PROCEDURE — 81025 URINE PREGNANCY TEST: CPT | Performed by: OBSTETRICS & GYNECOLOGY

## 2021-10-08 PROCEDURE — 999N000141 HC STATISTIC PRE-PROCEDURE NURSING ASSESSMENT: Performed by: OBSTETRICS & GYNECOLOGY

## 2021-10-08 PROCEDURE — 250N000009 HC RX 250: Performed by: REGISTERED NURSE

## 2021-10-08 PROCEDURE — G0463 HOSPITAL OUTPT CLINIC VISIT: HCPCS

## 2021-10-08 PROCEDURE — 56440 MRSPLZATN BRTHLNS GLND CST: CPT | Mod: GC

## 2021-10-08 PROCEDURE — 87070 CULTURE OTHR SPECIMN AEROBIC: CPT | Performed by: OBSTETRICS & GYNECOLOGY

## 2021-10-08 PROCEDURE — 360N000076 HC SURGERY LEVEL 3, PER MIN: Performed by: OBSTETRICS & GYNECOLOGY

## 2021-10-08 PROCEDURE — 250N000009 HC RX 250: Performed by: OBSTETRICS & GYNECOLOGY

## 2021-10-08 PROCEDURE — 87077 CULTURE AEROBIC IDENTIFY: CPT | Performed by: OBSTETRICS & GYNECOLOGY

## 2021-10-08 PROCEDURE — 36415 COLL VENOUS BLD VENIPUNCTURE: CPT | Performed by: OBSTETRICS & GYNECOLOGY

## 2021-10-08 PROCEDURE — 99214 OFFICE O/P EST MOD 30 MIN: CPT | Performed by: OBSTETRICS & GYNECOLOGY

## 2021-10-08 PROCEDURE — 82962 GLUCOSE BLOOD TEST: CPT

## 2021-10-08 PROCEDURE — 370N000017 HC ANESTHESIA TECHNICAL FEE, PER MIN: Performed by: OBSTETRICS & GYNECOLOGY

## 2021-10-08 PROCEDURE — 272N000001 HC OR GENERAL SUPPLY STERILE: Performed by: OBSTETRICS & GYNECOLOGY

## 2021-10-08 RX ORDER — ONDANSETRON 2 MG/ML
INJECTION INTRAMUSCULAR; INTRAVENOUS PRN
Status: DISCONTINUED | OUTPATIENT
Start: 2021-10-08 | End: 2021-10-08

## 2021-10-08 RX ORDER — PROPOFOL 10 MG/ML
INJECTION, EMULSION INTRAVENOUS PRN
Status: DISCONTINUED | OUTPATIENT
Start: 2021-10-08 | End: 2021-10-08

## 2021-10-08 RX ORDER — KETOROLAC TROMETHAMINE 30 MG/ML
INJECTION, SOLUTION INTRAMUSCULAR; INTRAVENOUS PRN
Status: DISCONTINUED | OUTPATIENT
Start: 2021-10-08 | End: 2021-10-08

## 2021-10-08 RX ORDER — ONDANSETRON 2 MG/ML
4 INJECTION INTRAMUSCULAR; INTRAVENOUS EVERY 30 MIN PRN
Status: CANCELLED | OUTPATIENT
Start: 2021-10-08

## 2021-10-08 RX ORDER — LIDOCAINE HYDROCHLORIDE 20 MG/ML
INJECTION, SOLUTION INFILTRATION; PERINEURAL PRN
Status: DISCONTINUED | OUTPATIENT
Start: 2021-10-08 | End: 2021-10-08

## 2021-10-08 RX ORDER — FENTANYL CITRATE 50 UG/ML
25 INJECTION, SOLUTION INTRAMUSCULAR; INTRAVENOUS EVERY 5 MIN PRN
Status: CANCELLED | OUTPATIENT
Start: 2021-10-08

## 2021-10-08 RX ORDER — ACETAMINOPHEN 325 MG/1
975 TABLET ORAL ONCE
Status: COMPLETED | OUTPATIENT
Start: 2021-10-08 | End: 2021-10-08

## 2021-10-08 RX ORDER — IBUPROFEN 200 MG
800 TABLET ORAL ONCE
Status: DISCONTINUED | OUTPATIENT
Start: 2021-10-08 | End: 2021-10-08 | Stop reason: HOSPADM

## 2021-10-08 RX ORDER — SODIUM CHLORIDE, SODIUM LACTATE, POTASSIUM CHLORIDE, CALCIUM CHLORIDE 600; 310; 30; 20 MG/100ML; MG/100ML; MG/100ML; MG/100ML
INJECTION, SOLUTION INTRAVENOUS CONTINUOUS
Status: CANCELLED | OUTPATIENT
Start: 2021-10-08

## 2021-10-08 RX ORDER — CEPHALEXIN 500 MG/1
500 CAPSULE ORAL 4 TIMES DAILY
Qty: 20 CAPSULE | Refills: 0 | Status: SHIPPED | OUTPATIENT
Start: 2021-10-08 | End: 2021-10-13

## 2021-10-08 RX ORDER — ACETAMINOPHEN 325 MG/1
975 TABLET ORAL ONCE
Status: DISCONTINUED | OUTPATIENT
Start: 2021-10-08 | End: 2021-10-08 | Stop reason: HOSPADM

## 2021-10-08 RX ORDER — ONDANSETRON 4 MG/1
4 TABLET, ORALLY DISINTEGRATING ORAL EVERY 30 MIN PRN
Status: CANCELLED | OUTPATIENT
Start: 2021-10-08

## 2021-10-08 RX ORDER — FENTANYL CITRATE 50 UG/ML
INJECTION, SOLUTION INTRAMUSCULAR; INTRAVENOUS PRN
Status: DISCONTINUED | OUTPATIENT
Start: 2021-10-08 | End: 2021-10-08

## 2021-10-08 RX ORDER — SODIUM CHLORIDE, SODIUM LACTATE, POTASSIUM CHLORIDE, CALCIUM CHLORIDE 600; 310; 30; 20 MG/100ML; MG/100ML; MG/100ML; MG/100ML
INJECTION, SOLUTION INTRAVENOUS CONTINUOUS PRN
Status: DISCONTINUED | OUTPATIENT
Start: 2021-10-08 | End: 2021-10-08

## 2021-10-08 RX ORDER — PROPOFOL 10 MG/ML
INJECTION, EMULSION INTRAVENOUS CONTINUOUS PRN
Status: DISCONTINUED | OUTPATIENT
Start: 2021-10-08 | End: 2021-10-08

## 2021-10-08 RX ADMIN — KETOROLAC TROMETHAMINE 15 MG: 30 INJECTION, SOLUTION INTRAMUSCULAR at 14:11

## 2021-10-08 RX ADMIN — PROPOFOL 40 MG: 10 INJECTION, EMULSION INTRAVENOUS at 13:41

## 2021-10-08 RX ADMIN — FENTANYL CITRATE 25 MCG: 50 INJECTION, SOLUTION INTRAMUSCULAR; INTRAVENOUS at 13:52

## 2021-10-08 RX ADMIN — SODIUM CHLORIDE, POTASSIUM CHLORIDE, SODIUM LACTATE AND CALCIUM CHLORIDE: 600; 310; 30; 20 INJECTION, SOLUTION INTRAVENOUS at 13:35

## 2021-10-08 RX ADMIN — LIDOCAINE HYDROCHLORIDE 25 MG: 20 INJECTION, SOLUTION INFILTRATION; PERINEURAL at 13:41

## 2021-10-08 RX ADMIN — FENTANYL CITRATE 25 MCG: 50 INJECTION, SOLUTION INTRAMUSCULAR; INTRAVENOUS at 13:48

## 2021-10-08 RX ADMIN — FENTANYL CITRATE 25 MCG: 50 INJECTION, SOLUTION INTRAMUSCULAR; INTRAVENOUS at 13:44

## 2021-10-08 RX ADMIN — ACETAMINOPHEN 975 MG: 325 TABLET, FILM COATED ORAL at 12:13

## 2021-10-08 RX ADMIN — MIDAZOLAM 2 MG: 1 INJECTION INTRAMUSCULAR; INTRAVENOUS at 13:35

## 2021-10-08 RX ADMIN — ONDANSETRON 4 MG: 2 INJECTION INTRAMUSCULAR; INTRAVENOUS at 14:11

## 2021-10-08 RX ADMIN — FENTANYL CITRATE 25 MCG: 50 INJECTION, SOLUTION INTRAMUSCULAR; INTRAVENOUS at 14:10

## 2021-10-08 RX ADMIN — PROPOFOL 100 MCG/KG/MIN: 10 INJECTION, EMULSION INTRAVENOUS at 13:41

## 2021-10-08 ASSESSMENT — MIFFLIN-ST. JEOR: SCORE: 1148.25

## 2021-10-08 NOTE — ANESTHESIA POSTPROCEDURE EVALUATION
Patient: Murtaza Arroyo    Procedure: Procedure(s):  possible MARSUPIALIZATION, CYST, BARTHOLIN'S GLAND LEFT  Exam under anesthesia pelvic  Incision and drainage left labial absess       Diagnosis:Bartholin gland cyst [N75.0]  Diagnosis Additional Information: No value filed.    Anesthesia Type:  General    Note:  Disposition: Outpatient   Postop Pain Control: Uneventful            Sign Out: Well controlled pain   PONV: No   Neuro/Psych: Uneventful            Sign Out: Acceptable/Baseline neuro status   Airway/Respiratory: Uneventful            Sign Out: Acceptable/Baseline resp. status   CV/Hemodynamics: Uneventful            Sign Out: Acceptable CV status; No obvious hypovolemia; No obvious fluid overload   Other NRE: NONE   DID A NON-ROUTINE EVENT OCCUR? No           Last vitals:  Vitals Value Taken Time   BP 96/57 10/08/21 1515   Temp 36.8  C (98.2  F) 10/08/21 1500   Pulse 64 10/08/21 1515   Resp 14 10/08/21 1420   SpO2 100 % 10/08/21 1525   Vitals shown include unvalidated device data.    Electronically Signed By: Emily Blue MD  October 8, 2021  5:29 PM

## 2021-10-08 NOTE — PROGRESS NOTES
"  Preop H and P    Murtaza Arroyo is a 30 year old G0 who is here for recurrent vulvar abscesses with rupture of current abscess overnight. She performed sitz bath for 45 minutes total and states it has stopped draining and is less painful.   She is scheduled today for Marsupialization/I and D.    CTD: She has been seen at [facility] for treatment of 3-4 abscesses over the last year. She has received at least 1 course of antibiotics and has had a Word catheter placement once also. Culture of one abscess event was sterile, though that event was associated with cellulitis of the skin. Typically the abscess is triggered by having sex. The abscess will grow over 5-7 days and then rupture. It is very painful. It is not associated with fever or rash. No dysuria or dyschezia.      Additionally, Murtaza notes that she has some pain upon entry with penetrative sex. She feels she has difficulty relaxing. She rarely feels orgasm.      Her LMP is: Patient's last menstrual period was 09/17/2021.    There is no problem list on file for this patient.    Past Medical History:   Diagnosis Date     Depressive disorder      Migraines      Past Surgical History:   Procedure Laterality Date     BIOPSY       /79   Pulse 78   Ht 1.549 m (5' 1\")   LMP 09/17/2021   BMI 20.48 kg/m    Appears well  Lungs CTA  CV RRR  Abdomen soft NT ND  EG: edematous left labium minorum with evidence of pinpoint lateral rupture.   Minimal groin lymphadenopathy on the left.   Introitus appears normal and the lesion does not appear to track into typical Bartholin's area  Anus wnl    A/P:  1. Recurrent cysts/abscess of unclear etiology  2. Recommend proceed with EUA, possibly probe the extent of the cyst/abscess, marsupialization, I and D, excision or whatever surgeon feels is best course of action to prevent recurrence   3. 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

## 2021-10-08 NOTE — DISCHARGE INSTRUCTIONS
You have been prescribed a 5 day course of antibiotics. Please take as instructed and finish entire course.     Same-Day Surgery   Adult Discharge Orders & Instructions     For 24 hours after surgery:  1. Get plenty of rest.  A responsible adult must stay with you for at least 24 hours after you leave the hospital.   2. Pain medication can slow your reflexes. Do not drive or use heavy equipment.  If you have weakness or tingling, don't drive or use heavy equipment until this feeling goes away.  3. Mixing alcohol and pain medication can cause dizziness and slow your breathing. It can even be fatal. Do not drink alcohol while taking pain medication.  4. Avoid strenuous or risky activities.  Ask for help when climbing stairs.   5. You may feel lightheaded.  If so, sit for a few minutes before standing.  Have someone help you get up.   6. If you have nausea (feel sick to your stomach), drink only clear liquids such as apple juice, ginger ale, broth or 7-Up.  Rest may also help.  Be sure to drink enough fluids.  Move to a regular diet as you feel able. Take pain medications with a small amount of solid food, such as toast or crackers, to avoid nausea.   7. A slight fever is normal. Call the doctor if your fever is over 100 F (37.7 C) (taken under the tongue) or lasts longer than 24 hours.  8. You may have a dry mouth, muscle aches, trouble sleeping or a sore throat.  These symptoms should go away after 24 hours.  9. Do not make important or legal decisions.   Pain Management:      1. Take pain medication (if prescribed) for pain as directed by your physician.        2. WARNING: If the pain medication you have been prescribed contains Tylenol  (acetaminophen), DO NOT take additional doses of Tylenol (acetaminophen).     Call your doctor for any of the followin.  Signs of infection (fever, growing tenderness at the surgery site, severe pain, a large amount of drainage or bleeding, foul-smelling drainage, redness,  swelling).    2.  It has been over 8 to 10 hours since surgery and you are still not able to urinate (pee).    3.  Headache for over 24 hours.    4.  Numbness, tingling or weakness the day after surgery (if you had spinal anesthesia).  To contact a doctor, call  Dr. Diane Tuttle's clinic [phone] or:      [phone] and ask for the Resident On Call for:          ______________________OBGYN____________________ (answered 24 hours a day)      Emergency Department:  [place] [facility]: [phone]  [place] [facility]: [phone]               Rev. 10/2014

## 2021-10-08 NOTE — H&P (VIEW-ONLY)
"  Preop H and P    Murtaza Arroyo is a 30 year old G0 who is here for recurrent vulvar abscesses with rupture of current abscess overnight. She performed sitz bath for 45 minutes total and states it has stopped draining and is less painful.   She is scheduled today for Marsupialization/I and D.    CTD: She has been seen at [facility] for treatment of 3-4 abscesses over the last year. She has received at least 1 course of antibiotics and has had a Word catheter placement once also. Culture of one abscess event was sterile, though that event was associated with cellulitis of the skin. Typically the abscess is triggered by having sex. The abscess will grow over 5-7 days and then rupture. It is very painful. It is not associated with fever or rash. No dysuria or dyschezia.      Additionally, Murtaza notes that she has some pain upon entry with penetrative sex. She feels she has difficulty relaxing. She rarely feels orgasm.      Her LMP is: Patient's last menstrual period was 09/17/2021.    There is no problem list on file for this patient.    Past Medical History:   Diagnosis Date     Depressive disorder      Migraines      Past Surgical History:   Procedure Laterality Date     BIOPSY       /79   Pulse 78   Ht 1.549 m (5' 1\")   LMP 09/17/2021   BMI 20.48 kg/m    Appears well  Lungs CTA  CV RRR  Abdomen soft NT ND  EG: edematous left labium minorum with evidence of pinpoint lateral rupture.   Minimal groin lymphadenopathy on the left.   Introitus appears normal and the lesion does not appear to track into typical Bartholin's area  Anus wnl    A/P:  1. Recurrent cysts/abscess of unclear etiology  2. Recommend proceed with EUA, possibly probe the extent of the cyst/abscess, marsupialization, I and D, excision or whatever surgeon feels is best course of action to prevent recurrence   3. 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

## 2021-10-08 NOTE — ANESTHESIA PREPROCEDURE EVALUATION
Anesthesia Pre-Procedure Evaluation    Patient: Murtaza Arroyo   MRN: [id number] : 1990        Preoperative Diagnosis: Bartholin gland cyst [N75.0]    Procedure : Procedure(s):  possible MARSUPIALIZATION, CYST, BARTHOLIN'S GLAND LEFT  Exam under anesthesia pelvic  Incision and drainage left labial absess          Past Medical History:   Diagnosis Date     Depressive disorder      Migraines       Past Surgical History:   Procedure Laterality Date     BIOPSY        No Known Allergies   Social History     Tobacco Use     Smoking status: Never Smoker     Smokeless tobacco: Never Used   Substance Use Topics     Alcohol use: Never      Wt Readings from Last 1 Encounters:   10/08/21 48 kg (105 lb 13.1 oz)        Anesthesia Evaluation   Pt has had prior anesthetic. Type of anesthetic: wisdom teeth.    No history of anesthetic complications       ROS/MED HX  ENT/Pulmonary:  - neg pulmonary ROS     Neurologic:  - neg neurologic ROS     Cardiovascular:  - neg cardiovascular ROS     METS/Exercise Tolerance: >4 METS    Hematologic:  - neg hematologic  ROS     Musculoskeletal:       GI/Hepatic:  - neg GI/hepatic ROS     Renal/Genitourinary: Comment: Bartholin gland cyst      Endo:  - neg endo ROS     Psychiatric/Substance Use:  - neg psychiatric ROS     Infectious Disease:       Malignancy:       Other:     (-) Any chance pregnant       Physical Exam    Airway  airway exam normal      Mallampati: II   TM distance: > 3 FB   Neck ROM: full   Mouth opening: > 3 cm    Respiratory Devices and Support         Dental  no notable dental history         Cardiovascular   cardiovascular exam normal       Rhythm and rate: regular and normal     Pulmonary   pulmonary exam normal        breath sounds clear to auscultation           OUTSIDE LABS:  CBC:   Lab Results   Component Value Date    HGB 12.9 10/08/2021     BMP:   Lab Results   Component Value Date    GLC 84 10/08/2021     COAGS: No results found for: PTT, INR, FIBR  POC:   Lab Results    Component Value Date    HCG Negative 10/08/2021     HEPATIC: No results found for: ALBUMIN, PROTTOTAL, ALT, AST, GGT, ALKPHOS, BILITOTAL, BILIDIRECT, PRASANTH  OTHER: No results found for: PH, LACT, A1C, CORY, PHOS, MAG, LIPASE, AMYLASE, TSH, T4, T3, CRP, SED    Anesthesia Plan    ASA Status:  1   NPO Status:  NPO Appropriate    Anesthesia Type: General.     - Airway: Native airway   Induction: Propofol.   Maintenance: TIVA.        Consents    Anesthesia Plan(s) and associated risks, benefits, and realistic alternatives discussed. Questions answered and patient/representative(s) expressed understanding.     - Discussed with:  Patient      - Extended Intubation/Ventilatory Support Discussed: No.      - Patient is DNR/DNI Status: No    Use of blood products discussed: No .     Postoperative Care    Pain management: Multi-modal analgesia, Oral pain medications.   PONV prophylaxis: Ondansetron (or other 5HT-3), Background Propofol Infusion     Comments:    Discussed risks of anesthesia including nausea, vomiting, sore throat, dental damage, cardiopulmonary complications, neurologic complication, and serious complications.              Lola Raymundo MD

## 2021-10-09 ENCOUNTER — HEALTH MAINTENANCE LETTER (OUTPATIENT)
Age: 31
End: 2021-10-09

## 2021-10-15 LAB
BACTERIA ABSC ANAEROBE+AEROBE CULT: ABNORMAL

## 2021-10-26 ENCOUNTER — OFFICE VISIT (OUTPATIENT)
Dept: OBGYN | Facility: CLINIC | Age: 31
End: 2021-10-26
Payer: COMMERCIAL

## 2021-10-26 VITALS
BODY MASS INDEX: 20.3 KG/M2 | WEIGHT: 111 LBS | HEART RATE: 80 BPM | DIASTOLIC BLOOD PRESSURE: 75 MMHG | SYSTOLIC BLOOD PRESSURE: 114 MMHG

## 2021-10-26 DIAGNOSIS — N76.4 VULVAR ABSCESS: Primary | ICD-10-CM

## 2021-10-26 PROCEDURE — G0463 HOSPITAL OUTPT CLINIC VISIT: HCPCS

## 2021-10-26 PROCEDURE — 99213 OFFICE O/P EST LOW 20 MIN: CPT | Mod: GE | Performed by: OBSTETRICS & GYNECOLOGY

## 2021-10-26 ASSESSMENT — PAIN SCALES - GENERAL: PAINLEVEL: NO PAIN (0)

## 2021-10-26 NOTE — NURSING NOTE
Chief Complaint   Patient presents with     Surgical Followup     Marsupialization cyst,Bartholins glands   Tati Zarate LPN

## 2021-10-26 NOTE — PROGRESS NOTES
[facility]  Postoperative Visit  10/26/2021    S: Murtaza Arroyo is a 30 year old  here for post-operative visit following L labial abscess I&D and marsupialization on 10/8/21. She reports feeling well and recovering well from surgery. She did sitz baths for the first week after surgery. She has occasional twinges of pain, but no regular pain. Denies bleeding or drainage from the area. Denies fevers/chills, abdominal pain, CP, SOB.     O:   /75   Pulse 80   Wt 50.3 kg (111 lb)   LMP 10/13/2021   Breastfeeding No   BMI 20.30 kg/m    Exam:   Gen: alert, NAD  CV: regular rate  Resp: breathing comfortably on room air  : normal appearing vulva. Area of previous abscess on the L vulva appears well healed, no erythema, drainage, or induration. Sutures present. No bartholin gland cysts    Labs:   Hemoglobin   Date Value Ref Range Status   10/08/2021 12.9 11.7 - 15.7 g/dL Final     Abscess cultures:  Anaerobic:  Staph epidermidis  1+ cutibacterium (propionibacterium) acnes    Aerobic:   1+ cutibacterium (propionibacterium) acnes      A/P: 30 year old female POD#18 s/p L labial abscess I&D and marsupialization. Doing well, and recovering well from surgery    # L labial abscess s/p marsupialization  - completed 5 days of Keflex post op, this should cover the bacteria that were from the abscess culture.   - surgical site appears well healed, no signs of infection or recurrent abscess  - discussed that she can return to normal activities. Discussed vulvar hygiene practices and answered questions.     Return to clinic as needed    Patient staffed with Dr. Juanita Núñez MD  OB/GYN PGY-2  10/26/2021 3:55 PM    The Patient was seen in [facility] by Dr. Núñez.   I reviewed the history & exam. Assessment and plan were jointly made.   Fifi Grant MD, MPH

## 2021-11-22 ENCOUNTER — OFFICE VISIT (OUTPATIENT)
Dept: OBGYN | Facility: CLINIC | Age: 31
End: 2021-11-22
Attending: OBSTETRICS & GYNECOLOGY
Payer: COMMERCIAL

## 2021-11-22 VITALS
WEIGHT: 107.3 LBS | SYSTOLIC BLOOD PRESSURE: 118 MMHG | HEIGHT: 62 IN | HEART RATE: 98 BPM | BODY MASS INDEX: 19.75 KG/M2 | DIASTOLIC BLOOD PRESSURE: 82 MMHG | TEMPERATURE: 97.9 F

## 2021-11-22 DIAGNOSIS — N75.1 ABSCESS OF BARTHOLIN'S GLAND: Primary | ICD-10-CM

## 2021-11-22 PROCEDURE — 56420 I&D BARTHOLINS GLAND ABSCESS: CPT | Performed by: OBSTETRICS & GYNECOLOGY

## 2021-11-22 PROCEDURE — 87070 CULTURE OTHR SPECIMN AEROBIC: CPT | Performed by: OBSTETRICS & GYNECOLOGY

## 2021-11-22 PROCEDURE — G0463 HOSPITAL OUTPT CLINIC VISIT: HCPCS

## 2021-11-22 RX ORDER — SULFAMETHOXAZOLE AND TRIMETHOPRIM 400; 80 MG/1; MG/1
TABLET ORAL
COMMUNITY
Start: 2021-11-19

## 2021-11-22 ASSESSMENT — MIFFLIN-ST. JEOR: SCORE: 1149.96

## 2021-11-22 ASSESSMENT — PAIN SCALES - GENERAL: PAINLEVEL: EXTREME PAIN (8)

## 2021-11-22 NOTE — LETTER
"11/22/2021       RE: Murtaza Arroyo  [address]  [facility] [place] [address]     Dear Colleague,    Thank you for referring your patient, Murtaza Arroyo, to the [facility] [place] [facility]. Please see a copy of my visit note below.    32 yo nulligravid female w/ left vulvar cyst.  Had OR I and D in Oct, 2021. At that time, cyst had spontaneously drained and was not able to be completely assessed.  The cyst recurred in the past few days.  Painful with some yellow discharge/drainage. No fever. Traveling tomorrow to [place] on airplane.  Would like relief ASAP if possible.      PROCEDURE NOTE     Incision and Drainage of Left bartholin gland cyst.      Time Out - \"Pause for the Cause\"  Just before the procedure begins, through verbal and active participation of team members, verify:     Initials   Patient Name    smd   Patient Date of Birth smd   Procedure to be performed  smd   Site, laterality, level or multiples, noting patient position   smd   Relevant images or diagnostics available/displayed   smd   Implants, special equipment or special requirements        smd      Consent:  Risks, benefits of treatment, and no treatment were discussed.  Patient's questions were elicited and answered.  and Written consent signed and scanned into medical record.     Preoperative Diagnosis: left bartholin gland cyst, recurrent  Postoperative Diagnosis:  same     Skin Preparation:  Betadine  Anesthesia: 4 mL 1% lidocaine without EPI   Technique:  Using an 11 blade, the area of the prior scar from I and D in past, a small incision was made over the most tense portion of the cyst. Copious yellow, thick fluid was expressed.  Plan was to suture the cyst wall open, but pt declined further manipulation.   Suture: none  EBL:  3 mL  Complications:  none  Total Time:  15  Pathology:  Bacterial culture  Tolerance of Procedure: Patient did tolerate the procedure " well.  Follow Up: pt advised to complete sitz baths TID over next sevaral days. If cyst recurrs, recommend excision in OR on urgent add on basis.        Taylor Reynolds MD, FACOG  (she/her/hers)    Department of Ob/Gyn/Women's Health  [employer] [facility] [employer]  [facility]  [address] [place] [address]  [email or web address]  p. [phone]  f. [phone]  11/23/2021  5:51 PM

## 2021-11-23 NOTE — PROCEDURES
"32 yo nulligravid female w/ left vulvar cyst.  Had OR I and D in Oct, 2021. At that time, cyst had spontaneously drained and was not able to be completely assessed.  The cyst recurred in the past few days.  Painful with some yellow discharge/drainage. No fever. Traveling tomorrow to [place] on airplane.  Would like relief ASAP if possible.     PROCEDURE NOTE    Incision and Drainage of Left bartholin gland cyst.     Time Out - \"Pause for the Cause\"  Just before the procedure begins, through verbal and active participation of team members, verify:    Initials   Patient Name    smd   Patient Date of Birth smd   Procedure to be performed  smd   Site, laterality, level or multiples, noting patient position   smd   Relevant images or diagnostics available/displayed   smd   Implants, special equipment or special requirements        smd     Consent:  Risks, benefits of treatment, and no treatment were discussed.  Patient's questions were elicited and answered.  and Written consent signed and scanned into medical record.    Preoperative Diagnosis: left bartholin gland cyst, recurrent  Postoperative Diagnosis:  same    Skin Preparation:  Betadine  Anesthesia: 4 mL 1% lidocaine without EPI   Technique:  Using an 11 blade, the area of the prior scar from I and D in past, a small incision was made over the most tense portion of the cyst. Copious yellow, thick fluid was expressed.  Plan was to suture the cyst wall open, but pt declined further manipulation.   Suture: none  EBL:  3 mL  Complications:  none  Total Time:  15  Pathology:  Bacterial culture  Tolerance of Procedure: Patient did tolerate the procedure well.  Follow Up: pt advised to complete sitz baths TID over next sevaral days. If cyst recurrs, recommend excision in OR on urgent add on basis.     "

## 2021-11-23 NOTE — PROGRESS NOTES
"32 yo nulligravid female w/ left vulvar cyst.  Had OR I and D in Oct, 2021. At that time, cyst had spontaneously drained and was not able to be completely assessed.  The cyst recurred in the past few days.  Painful with some yellow discharge/drainage. No fever. Traveling tomorrow to [place] on airplane.  Would like relief ASAP if possible.      PROCEDURE NOTE     Incision and Drainage of Left bartholin gland cyst.      Time Out - \"Pause for the Cause\"  Just before the procedure begins, through verbal and active participation of team members, verify:     Initials   Patient Name    smd   Patient Date of Birth smd   Procedure to be performed  smd   Site, laterality, level or multiples, noting patient position   smd   Relevant images or diagnostics available/displayed   smd   Implants, special equipment or special requirements        smd      Consent:  Risks, benefits of treatment, and no treatment were discussed.  Patient's questions were elicited and answered.  and Written consent signed and scanned into medical record.     Preoperative Diagnosis: left bartholin gland cyst, recurrent  Postoperative Diagnosis:  same     Skin Preparation:  Betadine  Anesthesia: 4 mL 1% lidocaine without EPI   Technique:  Using an 11 blade, the area of the prior scar from I and D in past, a small incision was made over the most tense portion of the cyst. Copious yellow, thick fluid was expressed.  Plan was to suture the cyst wall open, but pt declined further manipulation.   Suture: none  EBL:  3 mL  Complications:  none  Total Time:  15  Pathology:  Bacterial culture  Tolerance of Procedure: Patient did tolerate the procedure well.  Follow Up: pt advised to complete sitz baths TID over next sevaral days. If cyst recurrs, recommend excision in OR on urgent add on basis.        Taylor Reynolds MD, FACOG  (she/her/hers)    Department of Ob/Gyn/Women's Health  [employer]  [facility] " Building  [address] [place] [address]  [email or web address]  p. [phone]  f. [phone]  11/23/2021  5:51 PM

## 2021-11-24 LAB
BACTERIA ABSC ANAEROBE+AEROBE CULT: ABNORMAL
GRAM STAIN RESULT: ABNORMAL
GRAM STAIN RESULT: ABNORMAL

## 2021-12-07 ENCOUNTER — TELEPHONE (OUTPATIENT)
Dept: OBGYN | Facility: CLINIC | Age: 31
End: 2021-12-07
Payer: COMMERCIAL

## 2021-12-07 NOTE — TELEPHONE ENCOUNTER
Message received from patient regarding follow up for recurrent Bartholin cysts.    Called and spoke with patient. She states she does not currently have any abscesses or cysts. States she had in clinic I&D on 11/22 and was told to schedule follow up for when she has healed to evaluate possible cause of recurrent abscesses.     Pt would prefer to see Dr. Reynolds, scheduled first available on 1/3 at 1400. Encouraged patient to reach out if she has any questions or concerns in the meantime, pt verbalized understanding and agreement.

## 2021-12-09 ENCOUNTER — TELEPHONE (OUTPATIENT)
Dept: OBGYN | Facility: CLINIC | Age: 31
End: 2021-12-09

## 2021-12-09 NOTE — TELEPHONE ENCOUNTER
----- Message from Hazel Jung RN sent at 12/9/2021  9:56 AM CST -----  Regarding: recurring symptms - cyst

## 2021-12-09 NOTE — TELEPHONE ENCOUNTER
Discussed with Tatyana, recommend patient be seen urgently in clinic before scheduling in the OR. Tatyana able to schedule and will call patient.  Lani Cunningham MD

## 2021-12-09 NOTE — TELEPHONE ENCOUNTER
"Murtaza reports that she thinks bartholin cyst is returning.  She is not in \"pain\" yet, but reports slight discomfort and friction in same area as previous bartholin cyst, and slight bump when showering.  First noticed 2 days ago.  Soaked in tub once yesterday and once today.      Encouraged TID tub soaks.    Routed to Dr Cunningham to further advise.  Based on 11/22 note from dr Reynolds, if this recurred she recommends I&D in OR on urgent add on basis.  "

## 2021-12-17 ENCOUNTER — TRANSFERRED RECORDS (OUTPATIENT)
Dept: HEALTH INFORMATION MANAGEMENT | Facility: CLINIC | Age: 31
End: 2021-12-17
Payer: COMMERCIAL

## 2021-12-17 ENCOUNTER — MEDICAL CORRESPONDENCE (OUTPATIENT)
Dept: HEALTH INFORMATION MANAGEMENT | Facility: CLINIC | Age: 31
End: 2021-12-17
Payer: COMMERCIAL

## 2021-12-21 ENCOUNTER — TRANSCRIBE ORDERS (OUTPATIENT)
Dept: OTHER | Age: 31
End: 2021-12-21
Payer: COMMERCIAL

## 2021-12-21 DIAGNOSIS — N76.4 VULVAR ABSCESS: Primary | ICD-10-CM

## 2022-01-29 ENCOUNTER — HEALTH MAINTENANCE LETTER (OUTPATIENT)
Age: 32
End: 2022-01-29

## 2022-01-31 ENCOUNTER — VIRTUAL VISIT (OUTPATIENT)
Dept: DERMATOLOGY | Facility: CLINIC | Age: 32
End: 2022-01-31
Payer: COMMERCIAL

## 2022-01-31 DIAGNOSIS — N89.8 VAGINAL DRYNESS: ICD-10-CM

## 2022-01-31 DIAGNOSIS — N75.0 BARTHOLIN GLAND CYST: Primary | ICD-10-CM

## 2022-01-31 PROCEDURE — 99203 OFFICE O/P NEW LOW 30 MIN: CPT | Mod: 95 | Performed by: DERMATOLOGY

## 2022-01-31 ASSESSMENT — PAIN SCALES - GENERAL: PAINLEVEL: NO PAIN (0)

## 2022-01-31 NOTE — NURSING NOTE
Chief Complaint   Patient presents with     Derm Problem     Murtaza, is here for a cyst that she's had for about a year. No other concerns    Denis Cantu EMT

## 2022-01-31 NOTE — PATIENT INSTRUCTIONS
Vaginal moisturizers (K-Y Liquibeads, Replens, others), applied every few days to moisturize and keep vaginal tissues healthy.    Vaginal lubricants (Astroglide, Magali, Sliquid, others), applied at the time of sexual activity to alleviate pain during intercourse.

## 2022-01-31 NOTE — PROGRESS NOTES
[facility] Dermatology Note  Encounter Date: Jan 31, 2022  [employer] and [employer] ([phone] ). Location of teledermatologist: [facility] [place].  Start time: 12:46. End time: 1:14.    Dermatology Problem List:  1. Recurrent left-sided Bartholin gland cysts: s/p marsupialization and drainage  - prophylactic Sitz baths following triggers (friction from exercise, intercourse)  - may have concomitant/contributory irritant/allergic contact dermatitis - prior patch testing (11/2020) + Balsam of [place], fragrance mix, cinnamic aldehyde, wood tar mix  - labs pending  - in reserve: patch testing, excision  ____________________________________________    Assessment & Plan:     1. Recurrent Bartholin gland cysts vs abscesses: now s/p marsupialization and drainage. Stable currently but concerned for recurrence with triggering factors. May have component of irritant or allergic contact dermatitis - prior + patch testing to Balsam of [place], fragrance mix, cinnamic aldehyde, wood tar mix and current detergent is scented. Recommend Free & Clear detergent and preventative Sitz baths after exposures. Low suspicion for hormonal contribution to vaginal dryness but will check labs per patient preference. If refractory, consider repeat patch testing (?latex from condoms, possible cross-reaction w stretchy fabric in workout clothing).  - discussed vaginal moisturizers, lubricants, Sitz baths  - change to Free & Clear detergents  - check SHBG, estrogen, FSH, LH, TSH  - consider patch testing in future  - if remains refractory, consider excision        Procedures Performed:    None    Follow-up: 3-6 months in women's health clinic in person    Staff:     Liam Tadeo MD, FAAD   of Dermatology  Department of Dermatology  [facility]    ____________________________________________    CC: Derm Problem (Shan, is here for a cyst that  she's had for about a year. No other concerns)    HPI:  Ms. Murtaza Arroyo is a(n) 31 year old female who presents today as a new patient for recurrent Bartholin cysts    Bartholin gland cyst - first appeared 1/2021 - recurs every 2-3 months  - associated with sexual activity - comes back after intercourse  - frequency has increased - now flaring with friction from workouts  - has ruptured by itself after ~4-5 days  - 10/21 - had marsupialization - 1 month later still recurred  - then had drain 1 month later  - since then if has inciting events - takes Sitz bath and able to relieve discomfort  - no oral antibiotics  - painful - some chills, surrounding redness and heat  - does get irritation with detergents used for underwear   - current detergent does have scents    Patient is otherwise feeling well, without additional skin concerns.    Labs Reviewed:  10/21 culture - C.acnes, S.epidermidis  11/21 culture - K.pneumoniae    Physical Exam:  Vitals: There were no vitals taken for this visit.  SKIN: video images were reviewed; image quality and interpretability: acceptable. Image date: n/a.  - face only - no rash  - No other lesions of concern on areas examined.     Medications:  Current Outpatient Medications   Medication     sulfamethoxazole-trimethoprim (BACTRIM) 400-80 MG tablet     No current facility-administered medications for this visit.      Past Medical/Surgical History:   There is no problem list on file for this patient.    Past Medical History:   Diagnosis Date     Depressive disorder      Migraines        SHALINI Bush MD  [address] [address] on close of this encounter.

## 2022-01-31 NOTE — LETTER
1/31/2022       RE: Murtaza Arroyo  [address]  [place] [place] [address]     Dear Colleague,    Thank you for referring your patient, Murtaza Arroyo, to the [facility] [place] at [facility]. Please see a copy of my visit note below.    [facility] Dermatology Note  Encounter Date: Jan 31, 2022  [employer] and [employer] ([phone] ). Location of teledermatologist: [facility] [place].  Start time: 12:46. End time: 1:14.    Dermatology Problem List:  1. Recurrent left-sided Bartholin gland cysts: s/p marsupialization and drainage  - prophylactic Sitz baths following triggers (friction from exercise, intercourse)  - may have concomitant/contributory irritant/allergic contact dermatitis - prior patch testing (11/2020) + Balsam of [place], fragrance mix, cinnamic aldehyde, wood tar mix  - labs pending  - in reserve: patch testing, excision  ____________________________________________    Assessment & Plan:     1. Recurrent Bartholin gland cysts vs abscesses: now s/p marsupialization and drainage. Stable currently but concerned for recurrence with triggering factors. May have component of irritant or allergic contact dermatitis - prior + patch testing to Balsam of [place], fragrance mix, cinnamic aldehyde, wood tar mix and current detergent is scented. Recommend Free & Clear detergent and preventative Sitz baths after exposures. Low suspicion for hormonal contribution to vaginal dryness but will check labs per patient preference. If refractory, consider repeat patch testing (?latex from condoms, possible cross-reaction w stretchy fabric in workout clothing).  - discussed vaginal moisturizers, lubricants, Sitz baths  - change to Free & Clear detergents  - check SHBG, estrogen, FSH, LH, TSH  - consider patch testing in future  - if remains refractory, consider excision        Procedures Performed:     None    Follow-up: 3-6 months in women's health clinic in person    Staff:     Liam Tadeo MD, FAAD   of Dermatology  Department of Dermatology  [facility]    ____________________________________________    CC: Derm Problem (Murtaza, is here for a cyst that she's had for about a year. No other concerns)    HPI:  Ms. Murtaza Arroyo is a(n) 31 year old female who presents today as a new patient for recurrent Bartholin cysts    Bartholin gland cyst - first appeared 1/2021 - recurs every 2-3 months  - associated with sexual activity - comes back after intercourse  - frequency has increased - now flaring with friction from workouts  - has ruptured by itself after ~4-5 days  - 10/21 - had marsupialization - 1 month later still recurred  - then had drain 1 month later  - since then if has inciting events - takes Sitz bath and able to relieve discomfort  - no oral antibiotics  - painful - some chills, surrounding redness and heat  - does get irritation with detergents used for underwear   - current detergent does have scents    Patient is otherwise feeling well, without additional skin concerns.    Labs Reviewed:  10/21 culture - C.acnes, S.epidermidis  11/21 culture - K.pneumoniae    Physical Exam:  Vitals: There were no vitals taken for this visit.  SKIN: video images were reviewed; image quality and interpretability: acceptable. Image date: n/a.  - face only - no rash  - No other lesions of concern on areas examined.     Medications:  Current Outpatient Medications   Medication     sulfamethoxazole-trimethoprim (BACTRIM) 400-80 MG tablet     No current facility-administered medications for this visit.      Past Medical/Surgical History:   There is no problem list on file for this patient.    Past Medical History:   Diagnosis Date     Depressive disorder      Migraines        CC Gracy Bush MD  [address] [address] on close of this  encounter.

## 2022-02-14 ENCOUNTER — TELEPHONE (OUTPATIENT)
Dept: OBGYN | Facility: CLINIC | Age: 32
End: 2022-02-14
Payer: COMMERCIAL

## 2022-02-15 ENCOUNTER — MYC MEDICAL ADVICE (OUTPATIENT)
Dept: DERMATOLOGY | Facility: CLINIC | Age: 32
End: 2022-02-15
Payer: COMMERCIAL

## 2022-02-15 NOTE — TELEPHONE ENCOUNTER
[facility] Call Center    Phone Message    May a detailed message be left on voicemail: yes     Reason for Call: Patient has not heard back from clinic and is wondering if she can get in today.  Please reach out to patient.    Action Taken: Message routed to:  [facility] LEANN    Travel Screening: Not Applicable

## 2022-02-15 NOTE — TELEPHONE ENCOUNTER
"Pt called to get a \"add on\" appointment.  Pt informed that no such appointment time exists and the  will schedule her for the next available appointment.  "

## 2022-02-16 NOTE — TELEPHONE ENCOUNTER
Is there a procedure slot we can get her into with Vicenta Smith or Luis? I already discussed with Dr. Fontenot and he doesn't have availability. Could we also check if Dr. Degroot has availability in ? Thanks!

## 2022-02-16 NOTE — TELEPHONE ENCOUNTER
Please try to get this patient into a procedure slot.    Dr. Tadeo - is a resident okay?    Keri Nguyễn, [facility]

## 2022-02-17 NOTE — TELEPHONE ENCOUNTER
Dr. Smith and Dr. Smith do not have procedure slots in their template at this time. I am sending it to our derm surgery scheduler to look into a time.    Afia - Can you also look into Dr. Degroot/    Keri Nguyễn, CMA

## 2022-02-17 NOTE — TELEPHONE ENCOUNTER
Yes, a resident is okay. Do Vicenta Degroot, Sarah, or Luis have any availability at the [facility] [place] ?

## 2022-02-17 NOTE — TELEPHONE ENCOUNTER
Spencer, Keri Travis, [facility]; Liam Tadeo MD  The next procedure slot I have with a resident is 5/26/2022 at 7:15am     Afia Palmer, Procedure

## 2022-03-24 ENCOUNTER — LAB REQUISITION (OUTPATIENT)
Dept: LAB | Facility: CLINIC | Age: 32
End: 2022-03-24
Payer: COMMERCIAL

## 2022-03-24 DIAGNOSIS — M71.38 OTHER BURSAL CYST, OTHER SITE: ICD-10-CM

## 2022-03-24 PROCEDURE — 88305 TISSUE EXAM BY PATHOLOGIST: CPT | Mod: TC,ORL | Performed by: OBSTETRICS & GYNECOLOGY

## 2022-03-24 PROCEDURE — 88305 TISSUE EXAM BY PATHOLOGIST: CPT | Mod: 26 | Performed by: PATHOLOGY

## 2022-03-24 PROCEDURE — 87077 CULTURE AEROBIC IDENTIFY: CPT | Mod: ORL | Performed by: OBSTETRICS & GYNECOLOGY

## 2022-03-25 LAB
PATH REPORT.COMMENTS IMP SPEC: NORMAL
PATH REPORT.COMMENTS IMP SPEC: NORMAL
PATH REPORT.FINAL DX SPEC: NORMAL
PATH REPORT.GROSS SPEC: NORMAL
PATH REPORT.MICROSCOPIC SPEC OTHER STN: NORMAL
PATH REPORT.RELEVANT HX SPEC: NORMAL
PHOTO IMAGE: NORMAL

## 2022-03-26 LAB — BACTERIA FLD CULT: ABNORMAL

## 2022-09-11 ENCOUNTER — HEALTH MAINTENANCE LETTER (OUTPATIENT)
Age: 32
End: 2022-09-11

## 2023-07-29 ENCOUNTER — HEALTH MAINTENANCE LETTER (OUTPATIENT)
Age: 33
End: 2023-07-29

## 2024-09-21 ENCOUNTER — HEALTH MAINTENANCE LETTER (OUTPATIENT)
Age: 34
End: 2024-09-21

## 2025-04-04 ENCOUNTER — HOSPITAL ENCOUNTER (OUTPATIENT)
Facility: CLINIC | Age: 35
End: 2025-04-04
Admitting: STUDENT IN AN ORGANIZED HEALTH CARE EDUCATION/TRAINING PROGRAM
Payer: COMMERCIAL

## 2025-04-04 ENCOUNTER — TRANSFERRED RECORDS (OUTPATIENT)
Dept: HEALTH INFORMATION MANAGEMENT | Facility: CLINIC | Age: 35
End: 2025-04-04

## 2025-04-04 ENCOUNTER — HOSPITAL ENCOUNTER (OUTPATIENT)
Facility: CLINIC | Age: 35
Discharge: HOME | End: 2025-04-04
Attending: STUDENT IN AN ORGANIZED HEALTH CARE EDUCATION/TRAINING PROGRAM | Admitting: STUDENT IN AN ORGANIZED HEALTH CARE EDUCATION/TRAINING PROGRAM
Payer: COMMERCIAL

## 2025-04-04 VITALS
TEMPERATURE: 98.6 F | RESPIRATION RATE: 18 BRPM | DIASTOLIC BLOOD PRESSURE: 62 MMHG | SYSTOLIC BLOOD PRESSURE: 94 MMHG | HEIGHT: 62 IN | BODY MASS INDEX: 20.8 KG/M2 | WEIGHT: 113 LBS

## 2025-04-04 PROBLEM — Z36.89 ENCOUNTER FOR TRIAGE IN PREGNANT PATIENT: Status: ACTIVE | Noted: 2025-04-04

## 2025-04-04 LAB
ABO + RH BLD: NORMAL
ALBUMIN SERPL BCG-MCNC: 4.5 G/DL (ref 3.5–5.2)
ALP SERPL-CCNC: 49 U/L (ref 40–150)
ALT SERPL W P-5'-P-CCNC: 12 U/L (ref 0–50)
ANION GAP SERPL CALCULATED.3IONS-SCNC: 10 MMOL/L (ref 7–15)
AST SERPL W P-5'-P-CCNC: 10 U/L (ref 0–45)
BILIRUB SERPL-MCNC: 0.3 MG/DL
BLD GP AB SCN SERPL QL: NEGATIVE
BUN SERPL-MCNC: 11.6 MG/DL (ref 6–20)
CALCIUM SERPL-MCNC: 9.8 MG/DL (ref 8.8–10.4)
CHLORIDE SERPL-SCNC: 101 MMOL/L (ref 98–107)
CREAT SERPL-MCNC: 0.63 MG/DL (ref 0.51–0.95)
EGFRCR SERPLBLD CKD-EPI 2021: >90 ML/MIN/1.73M2
ERYTHROCYTE [DISTWIDTH] IN BLOOD BY AUTOMATED COUNT: 11.9 % (ref 10–15)
GLUCOSE SERPL-MCNC: 94 MG/DL (ref 70–99)
HCG INTACT+B SERPL-ACNC: 5125 MIU/ML
HCO3 SERPL-SCNC: 26 MMOL/L (ref 22–29)
HCT VFR BLD AUTO: 37.6 % (ref 35–47)
HGB BLD-MCNC: 12.6 G/DL (ref 11.7–15.7)
MCH RBC QN AUTO: 30.4 PG (ref 26.5–33)
MCHC RBC AUTO-ENTMCNC: 33.5 G/DL (ref 31.5–36.5)
MCV RBC AUTO: 91 FL (ref 78–100)
PLATELET # BLD AUTO: 237 10E3/UL (ref 150–450)
POTASSIUM SERPL-SCNC: 4.1 MMOL/L (ref 3.4–5.3)
PROT SERPL-MCNC: 7.4 G/DL (ref 6.4–8.3)
RBC # BLD AUTO: 4.14 10E6/UL (ref 3.8–5.2)
SODIUM SERPL-SCNC: 137 MMOL/L (ref 135–145)
SPECIMEN EXP DATE BLD: NORMAL
WBC # BLD AUTO: 5.9 10E3/UL (ref 4–11)

## 2025-04-04 PROCEDURE — 36415 COLL VENOUS BLD VENIPUNCTURE: CPT | Performed by: STUDENT IN AN ORGANIZED HEALTH CARE EDUCATION/TRAINING PROGRAM

## 2025-04-04 PROCEDURE — 84702 CHORIONIC GONADOTROPIN TEST: CPT | Performed by: STUDENT IN AN ORGANIZED HEALTH CARE EDUCATION/TRAINING PROGRAM

## 2025-04-04 PROCEDURE — 82310 ASSAY OF CALCIUM: CPT | Performed by: STUDENT IN AN ORGANIZED HEALTH CARE EDUCATION/TRAINING PROGRAM

## 2025-04-04 PROCEDURE — 96401 CHEMO ANTI-NEOPL SQ/IM: CPT

## 2025-04-04 PROCEDURE — 86850 RBC ANTIBODY SCREEN: CPT | Performed by: STUDENT IN AN ORGANIZED HEALTH CARE EDUCATION/TRAINING PROGRAM

## 2025-04-04 PROCEDURE — 82040 ASSAY OF SERUM ALBUMIN: CPT | Performed by: STUDENT IN AN ORGANIZED HEALTH CARE EDUCATION/TRAINING PROGRAM

## 2025-04-04 PROCEDURE — 80053 COMPREHEN METABOLIC PANEL: CPT | Performed by: STUDENT IN AN ORGANIZED HEALTH CARE EDUCATION/TRAINING PROGRAM

## 2025-04-04 PROCEDURE — 250N000011 HC RX IP 250 OP 636: Performed by: STUDENT IN AN ORGANIZED HEALTH CARE EDUCATION/TRAINING PROGRAM

## 2025-04-04 PROCEDURE — 85027 COMPLETE CBC AUTOMATED: CPT | Performed by: STUDENT IN AN ORGANIZED HEALTH CARE EDUCATION/TRAINING PROGRAM

## 2025-04-04 PROCEDURE — 86900 BLOOD TYPING SEROLOGIC ABO: CPT | Performed by: STUDENT IN AN ORGANIZED HEALTH CARE EDUCATION/TRAINING PROGRAM

## 2025-04-04 RX ORDER — VITAMIN A, VITAMIN C, VITAMIN D-3, VITAMIN E, VITAMIN B-1, VITAMIN B-2, NIACIN, VITAMIN B-6, CALCIUM, IRON, ZINC, COPPER 4000; 120; 400; 22; 1.84; 3; 20; 10; 1; 12; 200; 27; 25; 2 [IU]/1; MG/1; [IU]/1; MG/1; MG/1; MG/1; MG/1; MG/1; MG/1; UG/1; MG/1; MG/1; MG/1; MG/1
TABLET ORAL DAILY
COMMUNITY

## 2025-04-04 RX ORDER — FOLIC ACID 0.8 MG
800 TABLET ORAL DAILY
COMMUNITY

## 2025-04-04 RX ORDER — METHOTREXATE 25 MG/ML
50 INJECTION INTRA-ARTERIAL; INTRAMUSCULAR; INTRATHECAL; INTRAVENOUS ONCE
Status: COMPLETED | OUTPATIENT
Start: 2025-04-04 | End: 2025-04-04

## 2025-04-04 RX ADMIN — METHOTREXATE 75 MG: 25 SOLUTION INTRA-ARTERIAL; INTRAMUSCULAR; INTRATHECAL; INTRAVENOUS at 18:40

## 2025-04-04 ASSESSMENT — ACTIVITIES OF DAILY LIVING (ADL)
ADLS_ACUITY_SCORE: 18
ADLS_ACUITY_SCORE: 18
ADLS_ACUITY_SCORE: 41
ADLS_ACUITY_SCORE: 18
ADLS_ACUITY_SCORE: 18

## 2025-04-04 NOTE — PLAN OF CARE
1500.  Primip, here in [place], for methotrexate injection.   Pt had an ultrasound performed in clinic today and an appt with  to confirm ectopic pregnancy.     1535.  Labs drawn.    1545.  Plan verified with  over Vocera.    1630.  Vocera sent to  of HCG of 5,125 today.  Pharmacy called regarding todays HCG level as well, they are concerned about HCG being greater than 5000.     1705.  Informed pt of above, she would like to discuss with physician.  Notification to , she will call pt directly.    1722.   spoke with pt on phone and she would like to proceed with methotrexate injection.    1845.  Methotrexate injection given, discharge instructions given.   Questions answered.  Pt and , verbalize understanding and have followup lab appt for HCG levels to be drawn.    1920.  Pt and  left ambulatory at this time.

## 2025-04-08 ENCOUNTER — TRANSFERRED RECORDS (OUTPATIENT)
Dept: HEALTH INFORMATION MANAGEMENT | Facility: CLINIC | Age: 35
End: 2025-04-08
Payer: COMMERCIAL

## 2025-04-11 ENCOUNTER — HOSPITAL ENCOUNTER (OUTPATIENT)
Facility: CLINIC | Age: 35
Discharge: HOME | End: 2025-04-11
Attending: OBSTETRICS & GYNECOLOGY | Admitting: OBSTETRICS & GYNECOLOGY
Payer: COMMERCIAL

## 2025-04-11 VITALS
HEART RATE: 61 BPM | TEMPERATURE: 98.6 F | SYSTOLIC BLOOD PRESSURE: 100 MMHG | BODY MASS INDEX: 20.7 KG/M2 | WEIGHT: 113.2 LBS | DIASTOLIC BLOOD PRESSURE: 56 MMHG | RESPIRATION RATE: 16 BRPM | OXYGEN SATURATION: 98 %

## 2025-04-11 DIAGNOSIS — O00.90 ECTOPIC PREGNANCY: ICD-10-CM

## 2025-04-11 DIAGNOSIS — O00.201 ECTOPIC PREGNANCY OF RIGHT OVARY: Primary | ICD-10-CM

## 2025-04-11 DIAGNOSIS — O03.9 MISCARRIAGE OF RIGHT TUBAL ECTOPIC PREGNANCY: ICD-10-CM

## 2025-04-11 DIAGNOSIS — O00.101 MISCARRIAGE OF RIGHT TUBAL ECTOPIC PREGNANCY: ICD-10-CM

## 2025-04-11 PROCEDURE — 250N000009 HC RX 250: Performed by: OBSTETRICS & GYNECOLOGY

## 2025-04-11 PROCEDURE — 710N000012 HC RECOVERY PHASE 2, PER MINUTE: Performed by: OBSTETRICS & GYNECOLOGY

## 2025-04-11 PROCEDURE — 250N000013 HC RX MED GY IP 250 OP 250 PS 637: Performed by: OBSTETRICS & GYNECOLOGY

## 2025-04-11 PROCEDURE — 250N000011 HC RX IP 250 OP 636: Mod: JW | Performed by: OBSTETRICS & GYNECOLOGY

## 2025-04-11 PROCEDURE — 710N000009 HC RECOVERY PHASE 1, LEVEL 1, PER MIN: Performed by: OBSTETRICS & GYNECOLOGY

## 2025-04-11 PROCEDURE — 360N000076 HC SURGERY LEVEL 3, PER MIN: Performed by: OBSTETRICS & GYNECOLOGY

## 2025-04-11 PROCEDURE — 370N000017 HC ANESTHESIA TECHNICAL FEE, PER MIN: Performed by: OBSTETRICS & GYNECOLOGY

## 2025-04-11 PROCEDURE — 88305 TISSUE EXAM BY PATHOLOGIST: CPT | Mod: 26 | Performed by: PATHOLOGY

## 2025-04-11 PROCEDURE — 272N000001 HC OR GENERAL SUPPLY STERILE: Performed by: OBSTETRICS & GYNECOLOGY

## 2025-04-11 PROCEDURE — 88305 TISSUE EXAM BY PATHOLOGIST: CPT | Mod: TC | Performed by: OBSTETRICS & GYNECOLOGY

## 2025-04-11 PROCEDURE — 258N000001 HC RX 258: Performed by: OBSTETRICS & GYNECOLOGY

## 2025-04-11 PROCEDURE — 999N000141 HC STATISTIC PRE-PROCEDURE NURSING ASSESSMENT: Performed by: OBSTETRICS & GYNECOLOGY

## 2025-04-11 RX ORDER — ACETAMINOPHEN 325 MG/1
975 TABLET ORAL
Status: DISCONTINUED | OUTPATIENT
Start: 2025-04-11 | End: 2025-04-11 | Stop reason: HOSPADM

## 2025-04-11 RX ORDER — OXYCODONE HYDROCHLORIDE 5 MG/1
5-10 TABLET ORAL EVERY 4 HOURS PRN
Qty: 6 TABLET | Refills: 0 | Status: SHIPPED | OUTPATIENT
Start: 2025-04-11

## 2025-04-11 RX ORDER — HYDROMORPHONE HCL IN WATER/PF 6 MG/30 ML
0.4 PATIENT CONTROLLED ANALGESIA SYRINGE INTRAVENOUS EVERY 5 MIN PRN
Status: DISCONTINUED | OUTPATIENT
Start: 2025-04-11 | End: 2025-04-11 | Stop reason: HOSPADM

## 2025-04-11 RX ORDER — MINERAL OIL
OIL (ML) MISCELLANEOUS PRN
Status: DISCONTINUED | OUTPATIENT
Start: 2025-04-11 | End: 2025-04-11 | Stop reason: HOSPADM

## 2025-04-11 RX ORDER — OXYCODONE HYDROCHLORIDE 5 MG/1
5 TABLET ORAL
Status: DISCONTINUED | OUTPATIENT
Start: 2025-04-11 | End: 2025-04-11

## 2025-04-11 RX ORDER — DEXAMETHASONE SODIUM PHOSPHATE 4 MG/ML
4 INJECTION, SOLUTION INTRA-ARTICULAR; INTRALESIONAL; INTRAMUSCULAR; INTRAVENOUS; SOFT TISSUE
Status: DISCONTINUED | OUTPATIENT
Start: 2025-04-11 | End: 2025-04-11 | Stop reason: HOSPADM

## 2025-04-11 RX ORDER — ONDANSETRON 2 MG/ML
4 INJECTION INTRAMUSCULAR; INTRAVENOUS EVERY 30 MIN PRN
Status: DISCONTINUED | OUTPATIENT
Start: 2025-04-11 | End: 2025-04-11 | Stop reason: HOSPADM

## 2025-04-11 RX ORDER — SODIUM CHLORIDE, SODIUM LACTATE, POTASSIUM CHLORIDE, CALCIUM CHLORIDE 600; 310; 30; 20 MG/100ML; MG/100ML; MG/100ML; MG/100ML
INJECTION, SOLUTION INTRAVENOUS CONTINUOUS
Status: DISCONTINUED | OUTPATIENT
Start: 2025-04-11 | End: 2025-04-11 | Stop reason: HOSPADM

## 2025-04-11 RX ORDER — LABETALOL HYDROCHLORIDE 5 MG/ML
10 INJECTION, SOLUTION INTRAVENOUS
Status: DISCONTINUED | OUTPATIENT
Start: 2025-04-11 | End: 2025-04-11 | Stop reason: HOSPADM

## 2025-04-11 RX ORDER — ONDANSETRON 4 MG/1
4 TABLET, ORALLY DISINTEGRATING ORAL EVERY 30 MIN PRN
Status: DISCONTINUED | OUTPATIENT
Start: 2025-04-11 | End: 2025-04-11 | Stop reason: HOSPADM

## 2025-04-11 RX ORDER — NALOXONE HYDROCHLORIDE 0.4 MG/ML
0.1 INJECTION, SOLUTION INTRAMUSCULAR; INTRAVENOUS; SUBCUTANEOUS
Status: DISCONTINUED | OUTPATIENT
Start: 2025-04-11 | End: 2025-04-11 | Stop reason: HOSPADM

## 2025-04-11 RX ORDER — HYDROMORPHONE HCL IN WATER/PF 6 MG/30 ML
0.2 PATIENT CONTROLLED ANALGESIA SYRINGE INTRAVENOUS EVERY 5 MIN PRN
Status: DISCONTINUED | OUTPATIENT
Start: 2025-04-11 | End: 2025-04-11 | Stop reason: HOSPADM

## 2025-04-11 RX ORDER — BUPIVACAINE HYDROCHLORIDE 5 MG/ML
INJECTION, SOLUTION EPIDURAL; INTRACAUDAL; PERINEURAL PRN
Status: DISCONTINUED | OUTPATIENT
Start: 2025-04-11 | End: 2025-04-11 | Stop reason: HOSPADM

## 2025-04-11 RX ORDER — ACETAMINOPHEN 325 MG/1
975 TABLET ORAL ONCE
Status: COMPLETED | OUTPATIENT
Start: 2025-04-11 | End: 2025-04-11

## 2025-04-11 RX ORDER — FENTANYL CITRATE 50 UG/ML
25 INJECTION, SOLUTION INTRAMUSCULAR; INTRAVENOUS EVERY 5 MIN PRN
Status: DISCONTINUED | OUTPATIENT
Start: 2025-04-11 | End: 2025-04-11 | Stop reason: HOSPADM

## 2025-04-11 RX ORDER — OXYCODONE HYDROCHLORIDE 5 MG/1
5 TABLET ORAL
Status: DISCONTINUED | OUTPATIENT
Start: 2025-04-11 | End: 2025-04-11 | Stop reason: HOSPADM

## 2025-04-11 RX ORDER — ACETAMINOPHEN 325 MG/1
975 TABLET ORAL ONCE
Status: DISCONTINUED | OUTPATIENT
Start: 2025-04-11 | End: 2025-04-11 | Stop reason: HOSPADM

## 2025-04-11 RX ORDER — IBUPROFEN 800 MG/1
800 TABLET, FILM COATED ORAL ONCE
Status: DISCONTINUED | OUTPATIENT
Start: 2025-04-11 | End: 2025-04-11 | Stop reason: HOSPADM

## 2025-04-11 RX ORDER — CEFAZOLIN SODIUM/WATER 2 G/20 ML
2 SYRINGE (ML) INTRAVENOUS
Status: COMPLETED | OUTPATIENT
Start: 2025-04-11 | End: 2025-04-11

## 2025-04-11 RX ORDER — CEFAZOLIN SODIUM/WATER 2 G/20 ML
2 SYRINGE (ML) INTRAVENOUS SEE ADMIN INSTRUCTIONS
Status: DISCONTINUED | OUTPATIENT
Start: 2025-04-11 | End: 2025-04-11 | Stop reason: HOSPADM

## 2025-04-11 RX ORDER — LIDOCAINE 40 MG/G
CREAM TOPICAL
Status: DISCONTINUED | OUTPATIENT
Start: 2025-04-11 | End: 2025-04-11 | Stop reason: HOSPADM

## 2025-04-11 RX ORDER — FENTANYL CITRATE 50 UG/ML
50 INJECTION, SOLUTION INTRAMUSCULAR; INTRAVENOUS EVERY 5 MIN PRN
Status: DISCONTINUED | OUTPATIENT
Start: 2025-04-11 | End: 2025-04-11 | Stop reason: HOSPADM

## 2025-04-11 RX ADMIN — ACETAMINOPHEN 975 MG: 325 TABLET, FILM COATED ORAL at 11:55

## 2025-04-11 ASSESSMENT — ACTIVITIES OF DAILY LIVING (ADL)
ADLS_ACUITY_SCORE: 41

## 2025-04-11 NOTE — OP NOTE
Operative Note  Surgeons and Role:     * Keisha Paul MD - Primary  Preoperative Diagnosis: 34 year old  with  right tubal ectopic pregnancy  Postoperative Diagnosis:Post-Op Diagnosis Codes:     * Ectopic pregnancy [O00.90]  Name of Operation:  Single site laparoscopy, left salpingectomy  Anesthesia: General    Operative Findings:  Laparoscopic: 4 cm ectopic pregnancy in the right fallopian tube, normal appearing uterus, and normal appearing bilateral ovaries    Description Of Procedure    After obtaining the appropriate operative consents, the patient was taken to the operating room, where General was obtained without difficulty and found to be adequate. Prior to induction of anesthesia, bilateral SCDs were placed for VTE prophylaxis.     The patient was placed in dorsal lithotomy position.  A Jaramillo catheter was placed in the bladder.  A sterile speculum was placed in the vagina and the acorn cannula was attached to the anterior lip of the cervix with a tenaculum.  Attention was then turned to the patient's abdomen.  2 cm vertical umbilical incision was made through the umbilicus.  Cautery was used to dissect down to the fascia.  The fascia was tacked bilaterally with 0 Vicryl sutures and then incised with Villarreal scissors.  The peritoneum was entered bluntly.  The Olympus Tri-Port plus was introduced and deployed.  The abdomen was then insufflated with carbon dioxide gas.  The single site laparoscope was then introduced.  The patient was placed in Trendelenburg and the bowel was swept from the pelvis.  Inspection of the pelvic and abdominal survey revealed no free fluid or blood.  The right fallopian tube was distended for 4 cm of its length with a large presumed ectopic pregnancy.  There was no blood or rupture site.  The external surface of the fallopian tube was highly vascular.  The right ovary appeared normal.  The left ovary and fallopian tube appeared normal and the remainder of the pelvic abdominal  survey appeared normal.  At this time the Thunderbeat device was used to create a salpingostomy linear incision overlying the antimesenteric aspect of the right fallopian tube.  Immediately there was active bleeding noted.  The tissue was extruded from the fallopian tube and withdrawn through the port.  The walls and edges of the fallopian tube were cauterized with the thunder beat device.  After several attempts to achieve hemostasis, there is noted to be extensive bleeding.  It was felt that the fallopian tube itself had been cauterized to the point where it likely was not going to be functional again and decision was made to proceed with salpingectomy.  The fallopian tube was elevated and then the mesosalpinx was cauterized with the thunder beat device.  The fallopian tube was then amputated at the cornua.  There is no active bleeding noted at this time the pelvis was copiously irrigated the fallopian tube was then withdrawn through the port.  The port was then removed and the gas was released from the abdomen.  The fascia was closed with 0 Vicryl in a continuous length.  The subcutaneous layer was made hemostatic with cautery.  A single figure-of-eight stitch was placed with 3-0 Vicryl at this point to achieve hemostasis.  A small amount of Surgicel powder was placed in the umbilical incision.  The skin was then closed with 4-0 Monocryl in a running subcuticular fashion.  The patient tolerated the procedure without difficulty and she was taken to the recovery room in stable condition.  The instruments were removed from the vagina.  Sponge lap and needle counts were correct.      The patient tolerated the procedure well and the patient was taken to the recovery room in stable condition.      Complications: None  Estimated Blood Loss: 400 mL from 4/11/2025 12:08 PM to 4/11/2025  1:30 PM  Fluid Deficit: ml   Sponge/Instrument/Needle Counts: The sponge, lap and needle counts were correct x2.  Specimens Removed:   ID  Type Source Tests Collected by Time Destination   1 : RIGHT Fallopian Tube, Ectopic Pregnancy Tissue Fallopian Tube, Ectopic Pregnancy, Right SURGICAL PATHOLOGY EXAM Keisha Paul MD 4/11/2025  1:02 PM        Keisha Paul MD

## 2025-04-11 NOTE — PROGRESS NOTES
H&P update:  Resp CTAB  CV: RRR    No contraindication to surgery or anesthesia    Keisha Paul MD

## 2025-04-11 NOTE — DISCHARGE INSTRUCTIONS
You received Tylenol 975mg at 1200. Next dose of Tylenol after 6pm if needed.  Maximum acetaminophen (Tylenol) dose from all sources should not exceed 4 grams (4000 mg) per day. You last had 975mg at 12pm today, your next dose is 6pm or later.       Maximum ibuprofen (Advil) dose from all sources should not exceed 2.5 grams (2,400 mg) per day. You received Toradol, an IV form of Ibuprofen (Motrin) at 1:05 PM.  Do not take any Ibuprofen products until 7:05 PM or after.     DR. JOHN MATHEWS    [facility] PHONE NUMBER:  [phone].    [facility] SPECIALISTS

## 2025-08-22 ENCOUNTER — HOSPITAL ENCOUNTER (OUTPATIENT)
Dept: GENERAL RADIOLOGY | Facility: CLINIC | Age: 35
Discharge: HOME | End: 2025-08-22
Attending: OBSTETRICS & GYNECOLOGY | Admitting: OBSTETRICS & GYNECOLOGY
Payer: COMMERCIAL

## 2025-08-22 DIAGNOSIS — Z31.49 PROCREATIVE INVESTIGATION AND TESTING: ICD-10-CM

## 2025-08-22 LAB — HCG UR QL: NEGATIVE

## 2025-08-22 PROCEDURE — 74740 X-RAY FEMALE GENITAL TRACT: CPT

## 2025-08-22 PROCEDURE — 81025 URINE PREGNANCY TEST: CPT | Performed by: OBSTETRICS & GYNECOLOGY

## 2025-08-22 PROCEDURE — 255N000002 HC RX 255 OP 636: Performed by: OBSTETRICS & GYNECOLOGY

## 2025-08-22 RX ORDER — IOPAMIDOL 612 MG/ML
30 INJECTION, SOLUTION INTRAVASCULAR ONCE
Status: COMPLETED | OUTPATIENT
Start: 2025-08-22 | End: 2025-08-22

## 2025-08-22 RX ADMIN — IOPAMIDOL 17 ML: 612 INJECTION, SOLUTION INTRAVENOUS at 09:13

## (undated) DEVICE — DRAPE GYN/UROLOGY FLUID POUCH TUR 29455

## (undated) DEVICE — LINEN POUCH DBL 5427

## (undated) DEVICE — STRAP KNEE/BODY 31143004

## (undated) DEVICE — SOL NACL 0.9% IRRIG 1000ML BOTTLE 2F7124

## (undated) DEVICE — GLOVE BIOGEL PI ULTRATOUCH SZ 6.5 41165

## (undated) DEVICE — BAG CLEAR TRASH 1.3M 39X33" P4040C

## (undated) DEVICE — PAD CHUX UNDERPAD 30X36" P3036C

## (undated) DEVICE — ESU PENCIL W/HOLSTER E2350H

## (undated) DEVICE — DRSG TELFA 3X8" 1238

## (undated) DEVICE — SUCTION MANIFOLD NEPTUNE 2 SYS 4 PORT 0702-020-000

## (undated) DEVICE — SURGICEL POWDER ABSORBABLE HEMOSTAT 3GM 3013SP

## (undated) DEVICE — GLOVE PROTEXIS BLUE W/NEU-THERA 7.0  2D73EB70

## (undated) DEVICE — GLOVE BIOGEL PI MICRO INDICATOR UNDERGLOVE SZ 6.5 48965

## (undated) DEVICE — PACK GYN LAPAROSCOPY RIDGES

## (undated) DEVICE — SU MONOCRYL 4-0 PS-2 18" UND Y496G

## (undated) DEVICE — SYR 10ML FINGER CONTROL W/O NDL 309695

## (undated) DEVICE — SUCTION IRR STRYKERFLOW II W/TIP 250-070-520

## (undated) DEVICE — SU VICRYL 3-0 SH 27" J316H

## (undated) DEVICE — SU VICRYL+ 3-0 27IN SH UND VCP416H

## (undated) DEVICE — PREP CHLORAPREP 26ML TINTED HI-LITE ORANGE 930815

## (undated) DEVICE — SOLUTION IRRIGATION 0.9% NACL 1000ML R5200-01

## (undated) DEVICE — SOL WATER IRRIG 1000ML BOTTLE 2F7114

## (undated) DEVICE — CATH TRAY FOLEY SURESTEP 16FR DRAIN BAG STATOCK A899916

## (undated) DEVICE — ESU HANDPIECE THUNDERBEAT FRONT ACT 5MMX35CM TB-0535FCS

## (undated) DEVICE — SU VICRYL+ 0 27 UR6 VLT VCP603H

## (undated) DEVICE — DRAPE UNDER BUTTOCK 8483

## (undated) DEVICE — GLOVE BIOGEL PI ULTRATOUCH SZ 7.0 41170

## (undated) DEVICE — LINEN HALF SHEET 5512

## (undated) DEVICE — LINEN TOWEL PACK X5 5464

## (undated) DEVICE — BLADE KNIFE SURG 11 371111

## (undated) DEVICE — GLOVE PROTEXIS W/NEU-THERA 6.5  2D73TE65

## (undated) DEVICE — Device

## (undated) DEVICE — ESU CORD MONOPOLAR 10'  E0510

## (undated) DEVICE — GLOVE BIOGEL PI MICRO SZ 6.5 48565

## (undated) DEVICE — TROCAR TRIPORT PLUS OLYMPUS SINGLE ACCESS WA58010T

## (undated) DEVICE — LINEN GOWN X4 5410

## (undated) DEVICE — LINEN FULL SHEET 5511

## (undated) DEVICE — ESU GROUND PAD ADULT W/CORD E7507

## (undated) DEVICE — ESU GROUND PAD UNIVERSAL W/O CORD

## (undated) DEVICE — EVAC SYSTEM CLEAR FLOW SC082500

## (undated) RX ORDER — CEFAZOLIN SODIUM/WATER 2 G/20 ML
SYRINGE (ML) INTRAVENOUS
Status: DISPENSED
Start: 2025-04-11

## (undated) RX ORDER — ONDANSETRON 2 MG/ML
INJECTION INTRAMUSCULAR; INTRAVENOUS
Status: DISPENSED
Start: 2025-04-11

## (undated) RX ORDER — GLYCOPYRROLATE 0.2 MG/ML
INJECTION, SOLUTION INTRAMUSCULAR; INTRAVENOUS
Status: DISPENSED
Start: 2025-04-11

## (undated) RX ORDER — PROPOFOL 10 MG/ML
INJECTION, EMULSION INTRAVENOUS
Status: DISPENSED
Start: 2025-04-11

## (undated) RX ORDER — FENTANYL CITRATE 50 UG/ML
INJECTION, SOLUTION INTRAMUSCULAR; INTRAVENOUS
Status: DISPENSED
Start: 2025-04-11

## (undated) RX ORDER — ACETAMINOPHEN 325 MG/1
TABLET ORAL
Status: DISPENSED
Start: 2021-10-08

## (undated) RX ORDER — BUPIVACAINE HYDROCHLORIDE 5 MG/ML
INJECTION, SOLUTION EPIDURAL; INTRACAUDAL; PERINEURAL
Status: DISPENSED
Start: 2025-04-11

## (undated) RX ORDER — MINERAL OIL
OIL (ML) MISCELLANEOUS
Status: DISPENSED
Start: 2025-04-11

## (undated) RX ORDER — FENTANYL CITRATE 50 UG/ML
INJECTION, SOLUTION INTRAMUSCULAR; INTRAVENOUS
Status: DISPENSED
Start: 2021-10-08

## (undated) RX ORDER — ACETAMINOPHEN 325 MG/1
TABLET ORAL
Status: DISPENSED
Start: 2025-04-11

## (undated) RX ORDER — LIDOCAINE HYDROCHLORIDE 10 MG/ML
INJECTION, SOLUTION EPIDURAL; INFILTRATION; INTRACAUDAL; PERINEURAL
Status: DISPENSED
Start: 2021-10-08

## (undated) RX ORDER — KETOROLAC TROMETHAMINE 30 MG/ML
INJECTION, SOLUTION INTRAMUSCULAR; INTRAVENOUS
Status: DISPENSED
Start: 2021-10-08

## (undated) RX ORDER — FENTANYL CITRATE-0.9 % NACL/PF 10 MCG/ML
PLASTIC BAG, INJECTION (ML) INTRAVENOUS
Status: DISPENSED
Start: 2025-04-11

## (undated) RX ORDER — DEXAMETHASONE SODIUM PHOSPHATE 4 MG/ML
INJECTION, SOLUTION INTRA-ARTICULAR; INTRALESIONAL; INTRAMUSCULAR; INTRAVENOUS; SOFT TISSUE
Status: DISPENSED
Start: 2025-04-11